# Patient Record
Sex: FEMALE | Race: WHITE | Employment: FULL TIME | URBAN - NONMETROPOLITAN AREA
[De-identification: names, ages, dates, MRNs, and addresses within clinical notes are randomized per-mention and may not be internally consistent; named-entity substitution may affect disease eponyms.]

---

## 2018-04-03 ENCOUNTER — HOSPITAL ENCOUNTER (OUTPATIENT)
Dept: WOMENS IMAGING | Age: 48
Discharge: HOME | End: 2018-04-03
Payer: COMMERCIAL

## 2018-04-03 DIAGNOSIS — Z12.31 VISIT FOR SCREENING MAMMOGRAM: ICD-10-CM

## 2018-04-03 PROCEDURE — 77063 BREAST TOMOSYNTHESIS BI: CPT

## 2018-04-06 ENCOUNTER — HOSPITAL ENCOUNTER (OUTPATIENT)
Dept: WOMENS IMAGING | Age: 48
Discharge: HOME | End: 2018-04-06
Payer: COMMERCIAL

## 2018-04-06 DIAGNOSIS — R92.2 BREAST DENSITY: ICD-10-CM

## 2018-04-06 PROCEDURE — 76642 ULTRASOUND BREAST LIMITED: CPT

## 2018-04-17 ENCOUNTER — HOSPITAL ENCOUNTER (OUTPATIENT)
Dept: WOMENS IMAGING | Age: 48
End: 2018-04-17
Payer: COMMERCIAL

## 2018-04-17 ENCOUNTER — HOSPITAL ENCOUNTER (OUTPATIENT)
Dept: WOMENS IMAGING | Age: 48
Discharge: HOME | End: 2018-04-17
Payer: COMMERCIAL

## 2018-04-17 VITALS — SYSTOLIC BLOOD PRESSURE: 116 MMHG | DIASTOLIC BLOOD PRESSURE: 69 MMHG | HEART RATE: 91 BPM | TEMPERATURE: 98.7 F

## 2018-04-17 DIAGNOSIS — N63.0 BREAST MASS: ICD-10-CM

## 2018-04-17 PROCEDURE — C1894 INTRO/SHEATH, NON-LASER: HCPCS

## 2018-04-17 PROCEDURE — 19083 BX BREAST 1ST LESION US IMAG: CPT

## 2018-04-17 PROCEDURE — 88305 TISSUE EXAM BY PATHOLOGIST: CPT

## 2018-04-17 PROCEDURE — A4648 IMPLANTABLE TISSUE MARKER: HCPCS

## 2019-04-03 ENCOUNTER — OFFICE VISIT (OUTPATIENT)
Dept: PHYSICAL MEDICINE AND REHAB | Age: 49
End: 2019-04-03
Payer: COMMERCIAL

## 2019-04-03 VITALS
BODY MASS INDEX: 29.23 KG/M2 | HEIGHT: 63 IN | DIASTOLIC BLOOD PRESSURE: 83 MMHG | HEART RATE: 96 BPM | WEIGHT: 165 LBS | SYSTOLIC BLOOD PRESSURE: 117 MMHG

## 2019-04-03 DIAGNOSIS — M54.12 CERVICAL RADICULITIS: Primary | ICD-10-CM

## 2019-04-03 DIAGNOSIS — M48.02 CERVICAL SPINAL STENOSIS: ICD-10-CM

## 2019-04-03 DIAGNOSIS — M47.812 SPONDYLOSIS OF CERVICAL REGION WITHOUT MYELOPATHY OR RADICULOPATHY: ICD-10-CM

## 2019-04-03 PROCEDURE — 99244 OFF/OP CNSLTJ NEW/EST MOD 40: CPT | Performed by: PAIN MEDICINE

## 2019-04-03 RX ORDER — CYCLOBENZAPRINE HCL 5 MG
5 TABLET ORAL 3 TIMES DAILY PRN
COMMUNITY

## 2019-04-03 ASSESSMENT — ENCOUNTER SYMPTOMS
DIARRHEA: 0
NAUSEA: 0
ABDOMINAL PAIN: 0
COUGH: 0
BACK PAIN: 0
CONSTIPATION: 0
CHEST TIGHTNESS: 0
PHOTOPHOBIA: 0
SORE THROAT: 0
RHINORRHEA: 0
COLOR CHANGE: 0
SHORTNESS OF BREATH: 0
WHEEZING: 0
VOMITING: 0
SINUS PRESSURE: 0
EYE PAIN: 0

## 2019-04-03 NOTE — PROGRESS NOTES
[address] [facility]  [address] [facility] [address]  Dept: [phone]  Dept Fax: [phone]: [phone]    Visit Date: 4/3/2019    Rickey Prabhakar is a 57 y.o. female who is referred for pain management evaluation and treatment per Dr. Dimitry Artis. CAGE and CAGE-AID Questions   1. In the last three months, have you felt you should cut down or stop drinking or using drugs? Yes []        No [x]     2. In the last three months, has anyone annoyed you or gotten on your nerves by telling you to cut down or stop drinking or using drugs? Yes []        No [x]     3. In the last three months, have you felt guilty or bad about how much you drink or use drugs? Yes []        No [x]     4. In the last three months, have you been waking up wanting to have an alcoholic drink or use drugs? Yes []        No [x]        Opioid Risk Tool:  Clinician Form       1. Family History of Substance Abuse: Female Male    Alcohol   []1   []3    Illegal drugs   []2   []3    Prescription drugs     []4   []4   2. Personal History of Substance Abuse:          Alcohol   []3   []3    Illegal drugs   []4   []4    Prescription drugs     []5   []5   3. Age (margo box if between 12 and 39):     []1   []1   4. History of Preadolescent Sexual Abuse:     []3   []0   5. Psychological Disease:      Attention deficit disorder, obsessive-compulsive disorder, bipolar, schizophrenia   []2   []2      Depression     []1   []1    Scoring Totals 0 0     Total Score  Low Risk  Moderate Risk  High Risk   Risk Category   0 - 3   4 - 7   8 or Above      Patient states symptoms interfere with:   A.  General Activity:  yes   B. Mood: yes    C. Walking Ability:   no   D. Normal Work (Includes both work outside the home and housework):   yes    E.  Relations with Other People:  yes   F. Sleep:   yes   G.  Enjoyment of Life:  yes       HPI: ChiefComplaint: Neck pain    Neck Pain    Chronicity: Patient is a 58 y/o female comes with neck pain for last 2 years. Paient denies any injuries. The problem occurs constantly. The problem has been gradually worsening. The pain is associated with nothing. The pain is present in the left side. The quality of the pain is described as burning and stabbing. Pain scale: States pain scale at worse is a 9/10 and at best 4/10. The symptoms are aggravated by position and bending. The pain is same all the time. Associated symptoms include headaches, numbness and tingling. Pertinent negatives include no chest pain, fever, photophobia or weakness. She has tried muscle relaxants for the symptoms. The treatment provided mild relief. XR CERVICAL SPINE 2016:  IMPRESSION:    1. Mild C5-6. Moderate spurring at this level anteriorly and posterior limb. Slight retrolisthesis C5 on C6, 2 mm. 2. Straightening of the normal cervical lordosis. Cannot exclude muscle spasm. 3. Primary mandibular surgery with metallic screws in the jabier-mandibular rami bilaterally. MRI CERVICAL SPINE 2019:  Shows C6-7 central canal compromise secondary encroachment by posterior disc extrusion  Shows C3-C7 degenerative disc disease and facet arthropathy. The patient has No Known Allergies. PastMedical History  Sloane  has a past medical history of Arthritis and Liver disease. Past Surgical History  The patient  has a past surgical history that includes Hysterectomy; Cholecystectomy; Gonzalez-en-Y Gastric Bypass; Facial reconstruction surgery; fracture surgery; and  section (). Family History  This patient's family history includes Cancer (age of onset: 61) in her maternal grandmother. Social History  Sloane  reports that she has never smoked. She has never used smokeless tobacco. She reports that she drinks alcohol. She reports that she does not use drugs.     Medications    Current Outpatient Medications:    cyclobenzaprine (FLEXERIL) 5 MG tablet, Take 5 mg by mouth 3 times daily as needed for Muscle spasms (only taking nightly), Disp: , Rfl:     Prenatal Multivit-Min-Fe-FA (PRE- FORMULA PO), Take by mouth 2 times daily, Disp: , Rfl:     naproxen (NAPROSYN) 500 MG tablet, Take 1,000 mg by mouth 2 times daily (with meals). , Disp: , Rfl:     ferrous sulfate 325 (65 FE) MG tablet, Take 325 mg by mouth daily (with breakfast). , Disp: , Rfl:     Calcium Carbonate-Vitamin D (CALCIUM + D PO), Take  by mouth.  , Disp: , Rfl:     Cyanocobalamin (VITAMIN B-12 CR) 1000 MCG TBCR, Take  by mouth.  , Disp: , Rfl:     Subjective:      Review of Systems   Constitutional: Positive for activity change. Negative for appetite change, chills, diaphoresis, fatigue, fever and unexpected weight change. HENT: Negative for congestion, ear pain, hearing loss, mouth sores, nosebleeds, rhinorrhea, sinus pressure and sore throat. Eyes: Negative for photophobia, pain and visual disturbance. Respiratory: Negative for cough, chest tightness, shortness of breath and wheezing. Cardiovascular: Negative for chest pain and palpitations. Gastrointestinal: Negative for abdominal pain, constipation, diarrhea, nausea and vomiting. Endocrine: Negative for cold intolerance, heat intolerance, polydipsia, polyphagia and polyuria. Genitourinary: Negative for decreased urine volume, difficulty urinating, frequency and hematuria. Musculoskeletal: Positive for myalgias, neck pain and neck stiffness. Negative for arthralgias, back pain, gait problem and joint swelling. Skin: Negative for color change and rash. Allergic/Immunologic: Negative for food allergies and immunocompromised state. Neurological: Positive for tingling, numbness and headaches. Negative for dizziness, tremors, seizures, syncope, facial asymmetry, speech difficulty, weakness and light-headedness. Hematological: Does not bruise/bleed easily. Psychiatric/Behavioral: Positive for sleep disturbance. Negative for agitation, behavioral problems, confusion, decreased concentration, dysphoric mood, hallucinations, self-injury and suicidal ideas. The patient is not nervous/anxious and is not hyperactive. Objective:     Vitals:    04/03/19 0858   BP: 117/83   Site: Left Upper Arm   Position: Sitting   Cuff Size: Medium Adult   Pulse: 96   Weight: 165 lb (74.8 kg)   Height: 5' 3\" (1.6 m)       Physical Exam   Constitutional: She is oriented to person, place, and time. She appears well-developed and well-nourished. No distress. HENT:   Head: Normocephalic and atraumatic. Right Ear: External ear normal.   Left Ear: External ear normal.   Nose: Nose normal.   Mouth/Throat: Oropharynx is clear and moist. No oropharyngeal exudate. Eyes: Pupils are equal, round, and reactive to light. Conjunctivae and EOM are normal. Right eye exhibits no discharge. Left eye exhibits no discharge. No scleral icterus. Neck: Normal range of motion and full passive range of motion without pain. Neck supple. No muscular tenderness present. No neck rigidity. No edema, no erythema and normal range of motion present. No thyromegaly present. Cardiovascular: Normal rate, regular rhythm, normal heart sounds and intact distal pulses. Exam reveals no gallop and no friction rub. No murmur heard. Pulmonary/Chest: Effort normal and breath sounds normal. No respiratory distress. She has no wheezes. She has no rales. She exhibits no tenderness. Abdominal: Soft. Bowel sounds are normal. She exhibits no distension. There is no tenderness. There is no rebound and no guarding. Musculoskeletal:        Right shoulder: She exhibits normal range of motion and no tenderness. Left shoulder: She exhibits normal range of motion and no tenderness. Right ankle: She exhibits normal range of motion and no swelling.         Left ankle: She exhibits normal range of motion and no swelling. Cervical back: She exhibits decreased range of motion, tenderness and pain. Thoracic back: She exhibits no tenderness. Lumbar back: She exhibits decreased range of motion, tenderness and pain. Back:    Neurological: She is alert and oriented to person, place, and time. She has normal strength and normal reflexes. She is not disoriented. She displays no atrophy. No cranial nerve deficit or sensory deficit. She exhibits normal muscle tone. She displays a negative Romberg sign. Coordination and gait normal. She displays no Babinski's sign on the right side. She displays no Babinski's sign on the left side. Skin: Skin is warm. No rash noted. She is not diaphoretic. No erythema. No pallor. Psychiatric: She has a normal mood and affect. Her speech is normal and behavior is normal. Judgment and thought content normal. Her mood appears not anxious. Her affect is not angry, not blunt, not labile and not inappropriate. She is not agitated, not aggressive, not hyperactive, not slowed, not withdrawn, not actively hallucinating and not combative. Thought content is not paranoid and not delusional. Cognition and memory are normal. Cognition and memory are not impaired. She does not express impulsivity or inappropriate judgment. She does not exhibit a depressed mood. She expresses no homicidal and no suicidal ideation. She expresses no suicidal plans and no homicidal plans. She exhibits normal recent memory and normal remote memory. She is attentive. Nursing note and vitals reviewed. ROBIN test: neg  Yeoman's test: neg  Gaenslen test: neg     Assessment:     1. Cervical radiculitis    2. Cervical spinal stenosis    3. Spondylosis of cervical region without myelopathy or radiculopathy            Plan:      · Patient read and signed orientation and opioid agreement. · OARRS reviewed. Current MED: 0  · Patient was not offered naloxone for home.    · Discussed long term side effects of medications, tolerance, dependency and addiction. · Patient told can not receive any pain medications from any other source. · No evidence of abuse, diversion or aberrant behavior. · Prescription Needs: No prescription pain medications at this time   Medications and/or procedures to improve function and quality of life- patient understanding with this and that may not be pain free   Discussed possible weaning of medication dosing dependent on treatment/procedure results.  Discussed with patient about safe storage of medications at home   Testing: Reviewed MRI Cervical spine and XR with patient.  Procedures: Discussed BO at length.  Discussed with patient about risks with procedure including infection, reaction to medication, increased pain, or bleeding.  Medications:Reviewed. Previous Treatments tried:  · PT: No,  Secondary cost  · NSAIDs: Yes,  any benefit? No,  how long taken: takes  · Chiropractic: Yes,  any benefit? No  · Muscle relaxants: Yes,  any benefit? No  · Narcotics: No,  any benefit? No  · Spine surgeon consult: No  · Any Implants: Yes RLE and facial    Meds. Prescribed:   No orders of the defined types were placed in this encounter. Return for BO @ C6 or C7  left. Time spent with patient was 60 minutes more than 50% was spent  Counseling/coordinated the patient'scare.     Electronically signed by Alexei Ngo MD on 4/3/2019 at 5:40 PM

## 2019-04-03 NOTE — LETTER
[address] [facility]  [address] [facility] [address]  Phone: [phone]  Fax: [phone]    Vida Galvan MD        April 14, 2019       Patient: Faith Diamond   MR Number: [id number]   YOB: 1970   Date of Visit: 4/3/2019       Dear Dr. Marzena Tiwari:    Thank you for the request for consultation for Sherry Glass to me for the evaluation of neck pain. Below are the relevant portions of my assessment and plan of care. If you have questions, please do not hesitate to call me. I look forward to following Sloane along with you.     Sincerely,        Sandy Auguste MD    CC providers:  Paige Quigley MD  [address] [place] [address]  VIA In [address] [place] [address], MD [facility]y [facility]  [address] [facility]  [facility] [place]

## 2019-04-04 ENCOUNTER — TELEPHONE (OUTPATIENT)
Dept: PHYSICAL MEDICINE AND REHAB | Age: 49
End: 2019-04-04

## 2019-04-16 ENCOUNTER — HOSPITAL ENCOUNTER (OUTPATIENT)
Dept: WOMENS IMAGING | Age: 49
Discharge: HOME | End: 2019-04-16
Payer: COMMERCIAL

## 2019-04-16 DIAGNOSIS — Z12.31 VISIT FOR SCREENING MAMMOGRAM: ICD-10-CM

## 2019-04-16 PROCEDURE — 77063 BREAST TOMOSYNTHESIS BI: CPT

## 2019-04-23 ENCOUNTER — PREP FOR PROCEDURE (OUTPATIENT)
Dept: PHYSICAL MEDICINE AND REHAB | Age: 49
End: 2019-04-23

## 2019-04-23 NOTE — H&P (VIEW-ONLY)
Alex Obrien is an 57 y.o.  female. ChiefComplaint: Neck pain       The patient has No Known Allergies. PastMedical History  Sloane  has a past medical history of Arthritis and Liver disease. Past Surgical History  The patient  has a past surgical history that includes Hysterectomy; Cholecystectomy; Gonzalez-en-Y Gastric Bypass; Facial reconstruction surgery; fracture surgery; and  section (). Family History  This patient's family history includes Cancer (age of onset: 61) in her maternal grandmother. Social History  Sloane  reports that she has never smoked. She has never used smokeless tobacco. She reports that she drinks alcohol. She reports that she does not use drugs. Medications    Current Medication      Current Outpatient Medications:     cyclobenzaprine (FLEXERIL) 5 MG tablet, Take 5 mg by mouth 3 times daily as needed for Muscle spasms (only taking nightly), Disp: , Rfl:     Prenatal Multivit-Min-Fe-FA (PRE- FORMULA PO), Take by mouth 2 times daily, Disp: , Rfl:     naproxen (NAPROSYN) 500 MG tablet, Take 1,000 mg by mouth 2 times daily (with meals). , Disp: , Rfl:     ferrous sulfate 325 (65 FE) MG tablet, Take 325 mg by mouth daily (with breakfast). , Disp: , Rfl:     Calcium Carbonate-Vitamin D (CALCIUM + D PO), Take  by mouth.  , Disp: , Rfl:     Cyanocobalamin (VITAMIN B-12 CR) 1000 MCG TBCR, Take  by mouth.  , Disp: , Rfl:         Subjective:      Review of Systems   Constitutional: Positive for activity change. Negative for appetite change, chills, diaphoresis, fatigue, fever and unexpected weight change. HENT: Negative for congestion, ear pain, hearing loss, mouth sores, nosebleeds, rhinorrhea, sinus pressure and sore throat. Eyes: Negative for photophobia, pain and visual disturbance. Respiratory: Negative for cough, chest tightness, shortness of breath and wheezing. Cardiovascular: Negative for chest pain and palpitations.    Gastrointestinal: Negative for abdominal pain, constipation, diarrhea, nausea and vomiting. Endocrine: Negative for cold intolerance, heat intolerance, polydipsia, polyphagia and polyuria. Genitourinary: Negative for decreased urine volume, difficulty urinating, frequency and hematuria. Musculoskeletal: Positive for myalgias, neck pain and neck stiffness. Negative for arthralgias, back pain, gait problem and joint swelling. Skin: Negative for color change and rash. Allergic/Immunologic: Negative for food allergies and immunocompromised state. Neurological: Positive for tingling, numbness and headaches. Negative for dizziness, tremors, seizures, syncope, facial asymmetry, speech difficulty, weakness and light-headedness. Hematological: Does not bruise/bleed easily. Psychiatric/Behavioral: Positive for sleep disturbance. Negative for agitation, behavioral problems, confusion, decreased concentration, dysphoric mood, hallucinations, self-injury and suicidal ideas. The patient is not nervous/anxious and is not hyperactive. Objective:      Vitals                                  Weight: 165 lb (74.8 kg)   Height: 5' 3\" (1.6 m)            Physical Exam   Constitutional: She is oriented to person, place, and time. She appears well-developed and well-nourished. No distress. HENT:   Head: Normocephalic and atraumatic. Right Ear: External ear normal.   Left Ear: External ear normal.   Nose: Nose normal.   Mouth/Throat: Oropharynx is clear and moist. No oropharyngeal exudate. Eyes: Pupils are equal, round, and reactive to light. Conjunctivae and EOM are normal. Right eye exhibits no discharge. Left eye exhibits no discharge. No scleral icterus. Neck: Normal range of motion and full passive range of motion without pain. Neck supple. No muscular tenderness present. No neck rigidity. No edema, no erythema and normal range of motion present. No thyromegaly present.    Cardiovascular: Normal rate, regular rhythm, normal heart sounds and intact distal pulses. Exam reveals no gallop and no friction rub. No murmur heard. Pulmonary/Chest: Effort normal and breath sounds normal. No respiratory distress. She has no wheezes. She has no rales. She exhibits no tenderness. Abdominal: Soft. Bowel sounds are normal. She exhibits no distension. There is no tenderness. There is no rebound and no guarding. Musculoskeletal:        Right shoulder: She exhibits normal range of motion and no tenderness. Left shoulder: She exhibits normal range of motion and no tenderness. Right ankle: She exhibits normal range of motion and no swelling. Left ankle: She exhibits normal range of motion and no swelling. Cervical back: She exhibits decreased range of motion, tenderness and pain. Thoracic back: She exhibits no tenderness. Lumbar back: She exhibits decreased range of motion, tenderness and pain. Back:    Neurological: She is alert and oriented to person, place, and time. She has normal strength and normal reflexes. She is not disoriented. She displays no atrophy. No cranial nerve deficit or sensory deficit. She exhibits normal muscle tone. She displays a negative Romberg sign. Coordination and gait normal. She displays no Babinski's sign on the right side. She displays no Babinski's sign on the left side. Skin: Skin is warm. No rash noted. She is not diaphoretic. No erythema. No pallor. Psychiatric: She has a normal mood and affect. Her speech is normal and behavior is normal. Judgment and thought content normal. Her mood appears not anxious. Her affect is not angry, not blunt, not labile and not inappropriate. She is not agitated, not aggressive, not hyperactive, not slowed, not withdrawn, not actively hallucinating and not combative. Thought content is not paranoid and not delusional. Cognition and memory are normal. Cognition and memory are not impaired.  She does not express impulsivity or inappropriate judgment. She does not exhibit a depressed mood. She expresses no homicidal and no suicidal ideation. She expresses no suicidal plans and no homicidal plans. She exhibits normal recent memory and normal remote memory. She is attentive. Nursing note and vitals reviewed. ROBIN test: neg  Yeoman's test: neg  Gaenslen test: neg  Assessment:      1. Cervical radiculitis    2. Cervical spinal stenosis    3.  Spondylosis of cervical region without myelopathy or radiculopathy            Plan:  EPIDURAL STEROID INJECTION BO @ C6 or C7       PAOLO Willett - CNP  4/23/2019

## 2019-04-23 NOTE — PROGRESS NOTES
NPO after midnight  [employer] and drivers license  Wear comfortable clean clothing  Do not bring jewelry  Shower night before and morning of surgery with a liquid antibacterial soap  Bring list of medications with dosage and how often taken  Follow all instructions given by your physician   needed at discharge  Call [phone] for any questions

## 2019-04-23 NOTE — H&P
Haseeb Muñoz is an 57 y.o.  female. ChiefComplaint: Neck pain       The patient has No Known Allergies. PastMedical History  Sloane  has a past medical history of Arthritis and Liver disease. Past Surgical History  The patient  has a past surgical history that includes Hysterectomy; Cholecystectomy; Gonzalez-en-Y Gastric Bypass; Facial reconstruction surgery; fracture surgery; and  section (). Family History  This patient's family history includes Cancer (age of onset: 61) in her maternal grandmother. Social History  Sloane  reports that she has never smoked. She has never used smokeless tobacco. She reports that she drinks alcohol. She reports that she does not use drugs. Medications    Current Medication      Current Outpatient Medications:     cyclobenzaprine (FLEXERIL) 5 MG tablet, Take 5 mg by mouth 3 times daily as needed for Muscle spasms (only taking nightly), Disp: , Rfl:     Prenatal Multivit-Min-Fe-FA (PRE- FORMULA PO), Take by mouth 2 times daily, Disp: , Rfl:     naproxen (NAPROSYN) 500 MG tablet, Take 1,000 mg by mouth 2 times daily (with meals). , Disp: , Rfl:     ferrous sulfate 325 (65 FE) MG tablet, Take 325 mg by mouth daily (with breakfast). , Disp: , Rfl:     Calcium Carbonate-Vitamin D (CALCIUM + D PO), Take  by mouth.  , Disp: , Rfl:     Cyanocobalamin (VITAMIN B-12 CR) 1000 MCG TBCR, Take  by mouth.  , Disp: , Rfl:         Subjective:      Review of Systems   Constitutional: Positive for activity change. Negative for appetite change, chills, diaphoresis, fatigue, fever and unexpected weight change. HENT: Negative for congestion, ear pain, hearing loss, mouth sores, nosebleeds, rhinorrhea, sinus pressure and sore throat. Eyes: Negative for photophobia, pain and visual disturbance. Respiratory: Negative for cough, chest tightness, shortness of breath and wheezing. Cardiovascular: Negative for chest pain and palpitations.    Gastrointestinal: Negative for abdominal pain, constipation, diarrhea, nausea and vomiting. Endocrine: Negative for cold intolerance, heat intolerance, polydipsia, polyphagia and polyuria. Genitourinary: Negative for decreased urine volume, difficulty urinating, frequency and hematuria. Musculoskeletal: Positive for myalgias, neck pain and neck stiffness. Negative for arthralgias, back pain, gait problem and joint swelling. Skin: Negative for color change and rash. Allergic/Immunologic: Negative for food allergies and immunocompromised state. Neurological: Positive for tingling, numbness and headaches. Negative for dizziness, tremors, seizures, syncope, facial asymmetry, speech difficulty, weakness and light-headedness. Hematological: Does not bruise/bleed easily. Psychiatric/Behavioral: Positive for sleep disturbance. Negative for agitation, behavioral problems, confusion, decreased concentration, dysphoric mood, hallucinations, self-injury and suicidal ideas. The patient is not nervous/anxious and is not hyperactive. Objective:      Vitals                                  Weight: 165 lb (74.8 kg)   Height: 5' 3\" (1.6 m)            Physical Exam   Constitutional: She is oriented to person, place, and time. She appears well-developed and well-nourished. No distress. HENT:   Head: Normocephalic and atraumatic. Right Ear: External ear normal.   Left Ear: External ear normal.   Nose: Nose normal.   Mouth/Throat: Oropharynx is clear and moist. No oropharyngeal exudate. Eyes: Pupils are equal, round, and reactive to light. Conjunctivae and EOM are normal. Right eye exhibits no discharge. Left eye exhibits no discharge. No scleral icterus. Neck: Normal range of motion and full passive range of motion without pain. Neck supple. No muscular tenderness present. No neck rigidity. No edema, no erythema and normal range of motion present. No thyromegaly present.    Cardiovascular: Normal rate, regular rhythm, normal heart sounds and intact distal pulses. Exam reveals no gallop and no friction rub. No murmur heard. Pulmonary/Chest: Effort normal and breath sounds normal. No respiratory distress. She has no wheezes. She has no rales. She exhibits no tenderness. Abdominal: Soft. Bowel sounds are normal. She exhibits no distension. There is no tenderness. There is no rebound and no guarding. Musculoskeletal:        Right shoulder: She exhibits normal range of motion and no tenderness. Left shoulder: She exhibits normal range of motion and no tenderness. Right ankle: She exhibits normal range of motion and no swelling. Left ankle: She exhibits normal range of motion and no swelling. Cervical back: She exhibits decreased range of motion, tenderness and pain. Thoracic back: She exhibits no tenderness. Lumbar back: She exhibits decreased range of motion, tenderness and pain. Back:    Neurological: She is alert and oriented to person, place, and time. She has normal strength and normal reflexes. She is not disoriented. She displays no atrophy. No cranial nerve deficit or sensory deficit. She exhibits normal muscle tone. She displays a negative Romberg sign. Coordination and gait normal. She displays no Babinski's sign on the right side. She displays no Babinski's sign on the left side. Skin: Skin is warm. No rash noted. She is not diaphoretic. No erythema. No pallor. Psychiatric: She has a normal mood and affect. Her speech is normal and behavior is normal. Judgment and thought content normal. Her mood appears not anxious. Her affect is not angry, not blunt, not labile and not inappropriate. She is not agitated, not aggressive, not hyperactive, not slowed, not withdrawn, not actively hallucinating and not combative. Thought content is not paranoid and not delusional. Cognition and memory are normal. Cognition and memory are not impaired.  She does not express impulsivity or inappropriate judgment. She does not exhibit a depressed mood. She expresses no homicidal and no suicidal ideation. She expresses no suicidal plans and no homicidal plans. She exhibits normal recent memory and normal remote memory. She is attentive. Nursing note and vitals reviewed. ROBIN test: neg  Yeoman's test: neg  Gaenslen test: neg  Assessment:      1. Cervical radiculitis    2. Cervical spinal stenosis    3.  Spondylosis of cervical region without myelopathy or radiculopathy            Plan:  EPIDURAL STEROID INJECTION BO @ C6 or C7       PAOLO Beltrán - CNP  4/23/2019

## 2019-04-30 ENCOUNTER — APPOINTMENT (OUTPATIENT)
Dept: GENERAL RADIOLOGY | Age: 49
End: 2019-04-30
Attending: PAIN MEDICINE
Payer: COMMERCIAL

## 2019-04-30 ENCOUNTER — ANESTHESIA (OUTPATIENT)
Dept: OPERATING ROOM | Age: 49
End: 2019-04-30
Payer: COMMERCIAL

## 2019-04-30 ENCOUNTER — HOSPITAL ENCOUNTER (OUTPATIENT)
Age: 49
Setting detail: OUTPATIENT SURGERY
Discharge: HOME | End: 2019-04-30
Attending: PAIN MEDICINE | Admitting: PAIN MEDICINE
Payer: COMMERCIAL

## 2019-04-30 ENCOUNTER — ANESTHESIA EVENT (OUTPATIENT)
Dept: OPERATING ROOM | Age: 49
End: 2019-04-30
Payer: COMMERCIAL

## 2019-04-30 VITALS
HEIGHT: 63 IN | BODY MASS INDEX: 28.77 KG/M2 | DIASTOLIC BLOOD PRESSURE: 80 MMHG | TEMPERATURE: 97.7 F | OXYGEN SATURATION: 100 % | WEIGHT: 162.4 LBS | HEART RATE: 89 BPM | SYSTOLIC BLOOD PRESSURE: 128 MMHG | RESPIRATION RATE: 16 BRPM

## 2019-04-30 VITALS
RESPIRATION RATE: 18 BRPM | DIASTOLIC BLOOD PRESSURE: 70 MMHG | OXYGEN SATURATION: 100 % | SYSTOLIC BLOOD PRESSURE: 118 MMHG

## 2019-04-30 PROCEDURE — 2580000003 HC RX 258: Performed by: PAIN MEDICINE

## 2019-04-30 PROCEDURE — 3700000000 HC ANESTHESIA ATTENDED CARE: Performed by: PAIN MEDICINE

## 2019-04-30 PROCEDURE — 2500000003 HC RX 250 WO HCPCS: Performed by: PAIN MEDICINE

## 2019-04-30 PROCEDURE — 62321 NJX INTERLAMINAR CRV/THRC: CPT | Performed by: PAIN MEDICINE

## 2019-04-30 PROCEDURE — 3209999900 FLUORO FOR SURGICAL PROCEDURES

## 2019-04-30 PROCEDURE — 6360000004 HC RX CONTRAST MEDICATION: Performed by: PAIN MEDICINE

## 2019-04-30 PROCEDURE — 2709999900 HC NON-CHARGEABLE SUPPLY: Performed by: PAIN MEDICINE

## 2019-04-30 PROCEDURE — 7100000011 HC PHASE II RECOVERY - ADDTL 15 MIN: Performed by: PAIN MEDICINE

## 2019-04-30 PROCEDURE — 6360000002 HC RX W HCPCS: Performed by: PAIN MEDICINE

## 2019-04-30 PROCEDURE — 7100000010 HC PHASE II RECOVERY - FIRST 15 MIN: Performed by: PAIN MEDICINE

## 2019-04-30 PROCEDURE — 6360000002 HC RX W HCPCS: Performed by: NURSE ANESTHETIST, CERTIFIED REGISTERED

## 2019-04-30 PROCEDURE — 3600000052 HC PAIN LEVEL 2 BASE: Performed by: PAIN MEDICINE

## 2019-04-30 RX ORDER — 0.9 % SODIUM CHLORIDE 0.9 %
VIAL (ML) INJECTION PRN
Status: DISCONTINUED | OUTPATIENT
Start: 2019-04-30 | End: 2019-04-30 | Stop reason: ALTCHOICE

## 2019-04-30 RX ORDER — LIDOCAINE HYDROCHLORIDE 10 MG/ML
INJECTION, SOLUTION INFILTRATION; PERINEURAL PRN
Status: DISCONTINUED | OUTPATIENT
Start: 2019-04-30 | End: 2019-04-30 | Stop reason: ALTCHOICE

## 2019-04-30 RX ORDER — FENTANYL CITRATE 50 UG/ML
INJECTION, SOLUTION INTRAMUSCULAR; INTRAVENOUS PRN
Status: DISCONTINUED | OUTPATIENT
Start: 2019-04-30 | End: 2019-04-30 | Stop reason: SDUPTHER

## 2019-04-30 RX ORDER — ONDANSETRON 2 MG/ML
INJECTION INTRAMUSCULAR; INTRAVENOUS PRN
Status: DISCONTINUED | OUTPATIENT
Start: 2019-04-30 | End: 2019-04-30 | Stop reason: SDUPTHER

## 2019-04-30 RX ORDER — DEXAMETHASONE SODIUM PHOSPHATE 4 MG/ML
INJECTION, SOLUTION INTRA-ARTICULAR; INTRALESIONAL; INTRAMUSCULAR; INTRAVENOUS; SOFT TISSUE PRN
Status: DISCONTINUED | OUTPATIENT
Start: 2019-04-30 | End: 2019-04-30 | Stop reason: ALTCHOICE

## 2019-04-30 RX ADMIN — FENTANYL CITRATE 100 MCG: 50 INJECTION INTRAMUSCULAR; INTRAVENOUS at 14:53

## 2019-04-30 RX ADMIN — ONDANSETRON HYDROCHLORIDE 4 MG: 4 INJECTION, SOLUTION INTRAMUSCULAR; INTRAVENOUS at 14:59

## 2019-04-30 ASSESSMENT — PAIN - FUNCTIONAL ASSESSMENT: PAIN_FUNCTIONAL_ASSESSMENT: 0-10

## 2019-04-30 ASSESSMENT — PAIN DESCRIPTION - DESCRIPTORS: DESCRIPTORS: BURNING;CONSTANT

## 2019-04-30 NOTE — ANESTHESIA POSTPROCEDURE EVALUATION
Department of Anesthesiology  Postprocedure Note    Patient: Cirilo Andrew  MRN: [id number]  YOB: 1970  Date of evaluation: 4/30/2019  Time:  3:02 PM     Procedure Summary     Date:  04/30/19 Room / Location:  [facility] OR 03 / [address] [facility]    Anesthesia Start:  1450 Anesthesia Stop:  7383    Procedure:  EPIDURAL STEROID INJECTION BO @ C6 LEFT (N/A Neck) Diagnosis:  (Cervical spondylosis)    Surgeon:  River Chilel MD Responsible Provider:  Derek Ahuja MD    Anesthesia Type:  MAC ASA Status:  2          Anesthesia Type: MAC    Precious Phase I:      Precious Phase II:      Last vitals: Reviewed and per EMR flowsheets.        Anesthesia Post Evaluation    Patient location during evaluation: PACU  Patient participation: complete - patient participated  Level of consciousness: awake and alert  Airway patency: patent  Nausea & Vomiting: no nausea  Complications: no  Cardiovascular status: blood pressure returned to baseline and hemodynamically stable  Respiratory status: acceptable and spontaneous ventilation  Hydration status: euvolemic

## 2019-04-30 NOTE — PROGRESS NOTES
1502- Pt to PACU phase 2 was nauseated in OR, just given zofran, VSS, pt breathing deeply on room air, ice chips given, fiancee at bedside. 1520- IV removed. 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 1527- Pt discharged home assisted and ambulated out to the car gait steady.

## 2019-04-30 NOTE — INTERVAL H&P NOTE
H&P Update    Patient's History and Physical from April 23, 2019 was reviewed. Patient examined. There has been no change.     Van Ramsey

## 2019-04-30 NOTE — ANESTHESIA PRE PROCEDURE
Department of Anesthesiology  Preprocedure Note       Name:  Elena Good   Age:  57 y.o.  :  1970                                          MRN:  [id number]         Date:  2019      Surgeon: Venkata Mercado):  Aubrey Chaney MD    Procedure: EPIDURAL STEROID INJECTION BO @ C6 or C7 LEFT (N/A Neck)    Medications prior to admission:   Prior to Admission medications    Medication Sig Start Date End Date Taking? Authorizing Provider   cyclobenzaprine (FLEXERIL) 5 MG tablet Take 5 mg by mouth 3 times daily as needed for Muscle spasms (only taking nightly)   Yes Historical Provider, MD   Prenatal Multivit-Min-Fe-FA (PRE-SUNDAY FORMULA PO) Take by mouth 2 times daily   Yes Historical Provider, MD   naproxen (NAPROSYN) 500 MG tablet Take 1,000 mg by mouth 2 times daily as needed    Yes Historical Provider, MD   ferrous sulfate 325 (65 FE) MG tablet Take 325 mg by mouth daily (with breakfast). Yes Historical Provider, MD   Calcium Carbonate-Vitamin D (CALCIUM + D PO) Take by mouth nightly    Yes Historical Provider, MD   Cyanocobalamin (VITAMIN B-12 CR) 1000 MCG TBCR Take by mouth daily    Yes Historical Provider, MD       Current medications:    No current facility-administered medications for this encounter. Allergies:  No Known Allergies    Problem List:    Patient Active Problem List   Diagnosis Code    Acute appendicitis K35.80       Past Medical History:        Diagnosis Date    Arthritis     Liver disease     PONV (postoperative nausea and vomiting)        Past Surgical History:        Procedure Laterality Date    APPENDECTOMY       SECTION      CHOLECYSTECTOMY      FACIAL RECONSTRUCTION SURGERY      FRACTURE SURGERY      right lower leg cadaver bone    HYSTERECTOMY      SHELLY-EN-Y GASTRIC BYPASS         Social History:    Social History     Tobacco Use    Smoking status: Never Smoker    Smokeless tobacco: Never Used   Substance Use Topics    Alcohol use:  Yes Negative Neuro/Psych ROS              GI/Hepatic/Renal:             Endo/Other:    (+) : arthritis:., .          Pt had no PAT visit       Abdominal:           Vascular: negative vascular ROS. Anesthesia Plan      MAC     ASA 2       Induction: intravenous. Anesthetic plan and risks discussed with patient. Plan discussed with CRNA.                   Divya Mcgrath MD   4/30/2019

## 2019-04-30 NOTE — OP NOTE
lateral views of the fluoroscopy after injecting about 2 ml of Omnipaque-180 and observing the spread of the contrast in the epidural space. Then after negative aspiration a total of 12 mg of Celestone with 3 ml of normal saline were injected into the epidural space. The needle is removed and a Band-Aid was placed over the needle insertion site. Patient's vital signs remained stable and tolerated the procedure well. Patient was discharged home in stable condition and will be followed in the pain clinic in next few weeks for further planning.     Electronically signed by Jayson Harp MD on 4/10/2019 at 3:02 PM

## 2020-01-23 ENCOUNTER — HOSPITAL ENCOUNTER (INPATIENT)
Age: 50
LOS: 4 days | Discharge: HOME | DRG: 378 | End: 2020-01-27
Attending: FAMILY MEDICINE | Admitting: FAMILY MEDICINE
Payer: COMMERCIAL

## 2020-01-23 PROBLEM — K92.2 GIB (GASTROINTESTINAL BLEEDING): Status: ACTIVE | Noted: 2020-01-23

## 2020-01-23 LAB
ABO: NORMAL
ALBUMIN SERPL-MCNC: 4.3 G/DL (ref 3.5–5.1)
ALP BLD-CCNC: 75 U/L (ref 38–126)
ALT SERPL-CCNC: 10 U/L (ref 11–66)
ANION GAP SERPL CALCULATED.3IONS-SCNC: 12 MEQ/L (ref 8–16)
ANTIBODY SCREEN: NORMAL
APTT: 31.3 SECONDS (ref 22–38)
AST SERPL-CCNC: 13 U/L (ref 5–40)
BASOPHILS # BLD: 0.7 %
BASOPHILS ABSOLUTE: 0.1 THOU/MM3 (ref 0–0.1)
BILIRUB SERPL-MCNC: 0.3 MG/DL (ref 0.3–1.2)
BILIRUBIN DIRECT: < 0.2 MG/DL (ref 0–0.3)
BUN BLDV-MCNC: 21 MG/DL (ref 7–22)
CALCIUM SERPL-MCNC: 8.8 MG/DL (ref 8.5–10.5)
CHLORIDE BLD-SCNC: 103 MEQ/L (ref 98–111)
CO2: 26 MEQ/L (ref 23–33)
CREAT SERPL-MCNC: 0.7 MG/DL (ref 0.4–1.2)
EOSINOPHIL # BLD: 3.5 %
EOSINOPHILS ABSOLUTE: 0.3 THOU/MM3 (ref 0–0.4)
ERYTHROCYTE [DISTWIDTH] IN BLOOD BY AUTOMATED COUNT: 13 % (ref 11.5–14.5)
ERYTHROCYTE [DISTWIDTH] IN BLOOD BY AUTOMATED COUNT: 41.1 FL (ref 35–45)
GFR SERPL CREATININE-BSD FRML MDRD: 90 ML/MIN/1.73M2
GLUCOSE BLD-MCNC: 106 MG/DL (ref 70–108)
HCT VFR BLD CALC: 27.5 % (ref 37–47)
HCT VFR BLD CALC: 30.9 % (ref 37–47)
HEMOCCULT STL QL: POSITIVE
HEMOGLOBIN: 10.2 GM/DL (ref 12–16)
HEMOGLOBIN: 8.9 GM/DL (ref 12–16)
IMMATURE GRANS (ABS): 0.02 THOU/MM3 (ref 0–0.07)
IMMATURE GRANULOCYTES: 0.2 %
INR BLD: 1.09 (ref 0.85–1.13)
LYMPHOCYTES # BLD: 34.8 %
LYMPHOCYTES ABSOLUTE: 3.1 THOU/MM3 (ref 1–4.8)
MCH RBC QN AUTO: 28.8 PG (ref 26–33)
MCHC RBC AUTO-ENTMCNC: 33 GM/DL (ref 32.2–35.5)
MCV RBC AUTO: 87.3 FL (ref 81–99)
MONOCYTES # BLD: 4.2 %
MONOCYTES ABSOLUTE: 0.4 THOU/MM3 (ref 0.4–1.3)
NUCLEATED RED BLOOD CELLS: 0 /100 WBC
OSMOLALITY CALCULATION: 284.6 MOSMOL/KG (ref 275–300)
PLATELET # BLD: 222 THOU/MM3 (ref 130–400)
PMV BLD AUTO: 10.8 FL (ref 9.4–12.4)
POTASSIUM SERPL-SCNC: 3.9 MEQ/L (ref 3.5–5.2)
RBC # BLD: 3.54 MILL/MM3 (ref 4.2–5.4)
RH FACTOR: NORMAL
SEG NEUTROPHILS: 56.6 %
SEGMENTED NEUTROPHILS ABSOLUTE COUNT: 5 THOU/MM3 (ref 1.8–7.7)
SODIUM BLD-SCNC: 141 MEQ/L (ref 135–145)
TOTAL PROTEIN: 6.5 G/DL (ref 6.1–8)
WBC # BLD: 8.9 THOU/MM3 (ref 4.8–10.8)

## 2020-01-23 PROCEDURE — 86901 BLOOD TYPING SEROLOGIC RH(D): CPT

## 2020-01-23 PROCEDURE — 36415 COLL VENOUS BLD VENIPUNCTURE: CPT

## 2020-01-23 PROCEDURE — 86923 COMPATIBILITY TEST ELECTRIC: CPT

## 2020-01-23 PROCEDURE — 6360000002 HC RX W HCPCS: Performed by: NURSE PRACTITIONER

## 2020-01-23 PROCEDURE — 99284 EMERGENCY DEPT VISIT MOD MDM: CPT

## 2020-01-23 PROCEDURE — 85730 THROMBOPLASTIN TIME PARTIAL: CPT

## 2020-01-23 PROCEDURE — 82272 OCCULT BLD FECES 1-3 TESTS: CPT

## 2020-01-23 PROCEDURE — 85025 COMPLETE CBC W/AUTO DIFF WBC: CPT

## 2020-01-23 PROCEDURE — 96374 THER/PROPH/DIAG INJ IV PUSH: CPT

## 2020-01-23 PROCEDURE — 2580000003 HC RX 258: Performed by: FAMILY MEDICINE

## 2020-01-23 PROCEDURE — 1200000003 HC TELEMETRY R&B

## 2020-01-23 PROCEDURE — P9016 RBC LEUKOCYTES REDUCED: HCPCS

## 2020-01-23 PROCEDURE — 85018 HEMOGLOBIN: CPT

## 2020-01-23 PROCEDURE — 85610 PROTHROMBIN TIME: CPT

## 2020-01-23 PROCEDURE — 99223 1ST HOSP IP/OBS HIGH 75: CPT | Performed by: FAMILY MEDICINE

## 2020-01-23 PROCEDURE — 85014 HEMATOCRIT: CPT

## 2020-01-23 PROCEDURE — 2580000003 HC RX 258: Performed by: NURSE PRACTITIONER

## 2020-01-23 PROCEDURE — 86900 BLOOD TYPING SEROLOGIC ABO: CPT

## 2020-01-23 PROCEDURE — 0DB78ZX EXCISION OF STOMACH, PYLORUS, VIA NATURAL OR ARTIFICIAL OPENING ENDOSCOPIC, DIAGNOSTIC: ICD-10-PCS | Performed by: INTERNAL MEDICINE

## 2020-01-23 PROCEDURE — C9113 INJ PANTOPRAZOLE SODIUM, VIA: HCPCS | Performed by: NURSE PRACTITIONER

## 2020-01-23 PROCEDURE — 80053 COMPREHEN METABOLIC PANEL: CPT

## 2020-01-23 PROCEDURE — 86850 RBC ANTIBODY SCREEN: CPT

## 2020-01-23 PROCEDURE — 82248 BILIRUBIN DIRECT: CPT

## 2020-01-23 RX ORDER — ACETAMINOPHEN 325 MG/1
650 TABLET ORAL EVERY 4 HOURS PRN
Status: DISCONTINUED | OUTPATIENT
Start: 2020-01-23 | End: 2020-01-27 | Stop reason: HOSPADM

## 2020-01-23 RX ORDER — PANTOPRAZOLE SODIUM 40 MG/10ML
40 INJECTION, POWDER, LYOPHILIZED, FOR SOLUTION INTRAVENOUS ONCE
Status: DISCONTINUED | OUTPATIENT
Start: 2020-01-23 | End: 2020-01-24

## 2020-01-23 RX ORDER — 0.9 % SODIUM CHLORIDE 0.9 %
1000 INTRAVENOUS SOLUTION INTRAVENOUS ONCE
Status: COMPLETED | OUTPATIENT
Start: 2020-01-23 | End: 2020-01-23

## 2020-01-23 RX ORDER — SODIUM CHLORIDE 0.9 % (FLUSH) 0.9 %
10 SYRINGE (ML) INJECTION PRN
Status: DISCONTINUED | OUTPATIENT
Start: 2020-01-23 | End: 2020-01-27 | Stop reason: HOSPADM

## 2020-01-23 RX ORDER — ONDANSETRON 2 MG/ML
4 INJECTION INTRAMUSCULAR; INTRAVENOUS EVERY 6 HOURS PRN
Status: DISCONTINUED | OUTPATIENT
Start: 2020-01-23 | End: 2020-01-27 | Stop reason: HOSPADM

## 2020-01-23 RX ORDER — PANTOPRAZOLE SODIUM 40 MG/10ML
40 INJECTION, POWDER, LYOPHILIZED, FOR SOLUTION INTRAVENOUS ONCE
Status: COMPLETED | OUTPATIENT
Start: 2020-01-23 | End: 2020-01-23

## 2020-01-23 RX ORDER — SODIUM CHLORIDE 9 MG/ML
INJECTION, SOLUTION INTRAVENOUS CONTINUOUS
Status: DISCONTINUED | OUTPATIENT
Start: 2020-01-23 | End: 2020-01-25

## 2020-01-23 RX ORDER — PANTOPRAZOLE SODIUM 40 MG/10ML
40 INJECTION, POWDER, LYOPHILIZED, FOR SOLUTION INTRAVENOUS 2 TIMES DAILY
Status: DISCONTINUED | OUTPATIENT
Start: 2020-01-23 | End: 2020-01-24

## 2020-01-23 RX ORDER — SODIUM CHLORIDE 0.9 % (FLUSH) 0.9 %
10 SYRINGE (ML) INJECTION EVERY 12 HOURS SCHEDULED
Status: DISCONTINUED | OUTPATIENT
Start: 2020-01-23 | End: 2020-01-27 | Stop reason: HOSPADM

## 2020-01-23 RX ORDER — LANOLIN ALCOHOL/MO/W.PET/CERES
1000 CREAM (GRAM) TOPICAL DAILY
Status: DISCONTINUED | OUTPATIENT
Start: 2020-01-24 | End: 2020-01-27 | Stop reason: HOSPADM

## 2020-01-23 RX ADMIN — PANTOPRAZOLE SODIUM 40 MG: 40 INJECTION, POWDER, FOR SOLUTION INTRAVENOUS at 21:30

## 2020-01-23 RX ADMIN — SODIUM CHLORIDE: 9 INJECTION, SOLUTION INTRAVENOUS at 22:21

## 2020-01-23 RX ADMIN — SODIUM CHLORIDE 1000 ML: 9 INJECTION, SOLUTION INTRAVENOUS at 18:35

## 2020-01-23 ASSESSMENT — ENCOUNTER SYMPTOMS
NAUSEA: 0
ABDOMINAL PAIN: 0
BLOOD IN STOOL: 1
SHORTNESS OF BREATH: 1
VOMITING: 0

## 2020-01-23 ASSESSMENT — PAIN SCALES - GENERAL: PAINLEVEL_OUTOF10: 0

## 2020-01-23 NOTE — ED TRIAGE NOTES
Presents to ER after having a dark tarry bowel movement yesterday. Pt states she hasn't been feeling right since. States she has been lightheaded and dizzy since yesterday. States she has not had a bowel movement since. Pt noted to be pale in color. VSS. Will continue to monitor.

## 2020-01-23 NOTE — ED PROVIDER NOTES
[facility] Emergency Department    CHIEF COMPLAINT       Chief Complaint   Patient presents with    Rectal Bleeding       Nurses Notes reviewed and I agree except as noted in the HPI. HISTORY OF PRESENT ILLNESS    Sloane Oliveros Class stanley 54 y.o. female who presents to the ED for evaluation of black stools. Patient reports yesterday she felt like she was getting a cold so she went to the store to get some medicine. Patient reports while she was in the store she had sudden onset of diaphoresis and lightheadedness. Patient states she was holding onto the cart but she felt herself fall. Patient states the staff called EMS but patient didn't want to go to the hospital so she called her  to come get her. She states last night she had a bowel movement that was black and tarry. Patient states today she has been getting light headed and pale every time she stands and she has dyspnea on exertion. Patient reports she has only eaten a small amount of soup today. Patient has had a hysterectomy, cholecystectomy, appendectomy, and Gonzalez-en-Y gastric bypass. Patient denies having gastric issues since her gastric bypass. Patient denies hx of gastric ulcers. Patient had a EGD in 2012 before her surgery but denies seeing GI since the surgery. Patient denies chest pain, abdominal pain, nausea, vomiting, or blood thinner. Patient has no other complaints at this time. HPI was provided by the patient. REVIEW OF SYSTEMS     Review of Systems   Constitutional: Positive for diaphoresis. HENT: Negative for congestion. Eyes: Negative for visual disturbance. Respiratory: Positive for shortness of breath (ELIAS). Cardiovascular: Negative for chest pain. Gastrointestinal: Positive for blood in stool (melena). Negative for abdominal pain, nausea and vomiting. Genitourinary: Negative for difficulty urinating. Musculoskeletal: Negative for myalgias. Skin: Positive for pallor.    Neurological: Positive for Not on file    Transportation needs:     Medical: Not on file     Non-medical: Not on file   Tobacco Use    Smoking status: Never Smoker    Smokeless tobacco: Never Used   Substance and Sexual Activity    Alcohol use: Yes     Comment: rare    Drug use: No    Sexual activity: Yes     Partners: Male   Lifestyle    Physical activity:     Days per week: Not on file     Minutes per session: Not on file    Stress: Not on file   Relationships    Social connections:     Talks on phone: Not on file     Gets together: Not on file     Attends [employer] service: Not on file     Active member of club or organization: Not on file     Attends meetings of clubs or organizations: Not on file     Relationship status: Not on file    Intimate partner violence:     Fear of current or ex partner: Not on file     Emotionally abused: Not on file     Physically abused: Not on file     Forced sexual activity: Not on file   Other Topics Concern    Not on file   Social History Narrative    Not on file       PHYSICAL EXAM     INITIAL VITALS:  height is 5' 3\" (1.6 m) and weight is 160 lb (72.6 kg). Her oral temperature is 98.4 °F (36.9 °C). Her blood pressure is 132/72 and her pulse is 114. Her respiration is 15 and oxygen saturation is 100%. Physical Exam  Vitals signs and nursing note reviewed. Constitutional:       Appearance: Normal appearance. She is well-developed. HENT:      Head: Normocephalic. Mouth/Throat:      Pharynx: Uvula midline. Eyes:      Conjunctiva/sclera: Conjunctivae normal.   Neck:      Musculoskeletal: Normal range of motion and neck supple. Cardiovascular:      Rate and Rhythm: Regular rhythm. Tachycardia present. Heart sounds: Normal heart sounds, S1 normal and S2 normal.   Pulmonary:      Effort: Pulmonary effort is normal. No respiratory distress. Breath sounds: Normal breath sounds. Chest:      Chest wall: No tenderness.    Abdominal:      General: Bowel sounds are normal. There is no distension. Palpations: Abdomen is soft. Tenderness: There is no tenderness. Musculoskeletal: Normal range of motion. Lymphadenopathy:      Cervical: No cervical adenopathy. Skin:     General: Skin is warm and dry. Neurological:      Mental Status: She is alert and oriented to person, place, and time. Psychiatric:         Speech: Speech normal.         Behavior: Behavior normal.         Thought Content: Thought content normal.         DIFFERENTIAL DIAGNOSIS:   Included but not limited to: GI bleed, peptic ulcer, bleeding at the anastomoses of a Gonzalez-en-Y,    DIAGNOSTIC RESULTS       RADIOLOGY: non-plainfilm images(s) such as CT, Ultrasound and MRI are read by the radiologist.  Plain radiographic images are visualized and preliminarily interpreted by the emergency physician unless otherwise stated below. No orders to display         LABS:   Labs Reviewed   CBC WITH AUTO DIFFERENTIAL - Abnormal; Notable for the following components:       Result Value    RBC 3.54 (*)     Hemoglobin 10.2 (*)     Hematocrit 30.9 (*)     All other components within normal limits   APTT   PROTIME-INR   BASIC METABOLIC PANEL   HEPATIC FUNCTION PANEL   URINALYSIS WITH MICROSCOPIC   TYPE AND SCREEN       EMERGENCY DEPARTMENT COURSE:   Vitals:    Vitals:    01/23/20 1752   BP: 132/72   Pulse: 114   Resp: 15   Temp: 98.4 °F (36.9 °C)   TempSrc: Oral   SpO2: 100%   Weight: 160 lb (72.6 kg)   Height: 5' 3\" (1.6 m)       MDM    Patient was seen and evaluated in the emergency department, patient appears to be in no acute distress, vital signs were were reviewed, significant tachycardia and slight hypotension were noted. No respiratory distress was noted. Abdomen was soft, no tenderness was noted, no distention was noted. Labs were obtained, no significant anemia was noted, rectal exam occult study reveals positive for blood. No other significant abnormalities were noted.   Based on the patient's vital signs, failure to respond to IV fluids, and blood in her stool I feel she requires admission for further evaluation. I discussed the case with Dr. Rain Ortega, who graciously except the patient for admission. I initiated Protonix, and ordered a repeat H&H for the patient was down in the emergency department. Bev my findings my plan of care with patient she is amenable with admission. Medications   0.9 % sodium chloride bolus (has no administration in time range)       Patient was seenindependently by myself. The patient's final impression and disposition and plan was determined by myself. CRITICAL CARE:   None    CONSULTS:  Dr. Rain Ortega hospitalist    PROCEDURES:  None    FINAL IMPRESSION   No diagnosis found. DISPOSITION/PLAN   Admit    PATIENT REFERREDTO:  No follow-up provider specified. DISCHARGE MEDICATIONS:  New Prescriptions    No medications on file       (Please note that portions of this note were completed with a voice recognition program.  Efforts were made to edit the dictations but occasionally words are mis-transcribed.)    Scribe:  Twyla Iqbal 1/23/20 6:24 PM Scribing for and in the presence of Faisal Carmona CNP. Signed by: Sean Del Castillo, 01/23/20 6:24 PM    Provider:  I personally performed the services described in the documentation,reviewed and edited the documentation which was dictated to the scribe in my presence, and it accurately records my words and actions.     Faisal Carmona CNP 01/23/20 6:24 PM             PAOLO Hopper CNP  01/23/20 1867

## 2020-01-24 ENCOUNTER — APPOINTMENT (OUTPATIENT)
Dept: CT IMAGING | Age: 50
DRG: 378 | End: 2020-01-24
Payer: COMMERCIAL

## 2020-01-24 ENCOUNTER — ANESTHESIA EVENT (OUTPATIENT)
Dept: ENDOSCOPY | Age: 50
DRG: 378 | End: 2020-01-24
Payer: COMMERCIAL

## 2020-01-24 ENCOUNTER — ANESTHESIA (OUTPATIENT)
Dept: ENDOSCOPY | Age: 50
DRG: 378 | End: 2020-01-24
Payer: COMMERCIAL

## 2020-01-24 VITALS
RESPIRATION RATE: 24 BRPM | OXYGEN SATURATION: 100 % | SYSTOLIC BLOOD PRESSURE: 100 MMHG | DIASTOLIC BLOOD PRESSURE: 64 MMHG

## 2020-01-24 LAB
ALBUMIN SERPL-MCNC: 3.2 G/DL (ref 3.5–5.1)
ALP BLD-CCNC: 57 U/L (ref 38–126)
ALT SERPL-CCNC: 8 U/L (ref 11–66)
ANION GAP SERPL CALCULATED.3IONS-SCNC: 10 MEQ/L (ref 8–16)
AST SERPL-CCNC: 12 U/L (ref 5–40)
BACTERIA: ABNORMAL
BILIRUB SERPL-MCNC: 0.3 MG/DL (ref 0.3–1.2)
BILIRUBIN URINE: NEGATIVE
BLOOD, URINE: NEGATIVE
BUN BLDV-MCNC: 16 MG/DL (ref 7–22)
CALCIUM SERPL-MCNC: 8.1 MG/DL (ref 8.5–10.5)
CASTS: ABNORMAL /LPF
CASTS: ABNORMAL /LPF
CHARACTER, URINE: CLEAR
CHLORIDE BLD-SCNC: 109 MEQ/L (ref 98–111)
CO2: 23 MEQ/L (ref 23–33)
COLOR: YELLOW
CREAT SERPL-MCNC: 0.5 MG/DL (ref 0.4–1.2)
CRYSTALS: ABNORMAL
EPITHELIAL CELLS, UA: ABNORMAL /HPF
ERYTHROCYTE [DISTWIDTH] IN BLOOD BY AUTOMATED COUNT: 13.1 % (ref 11.5–14.5)
ERYTHROCYTE [DISTWIDTH] IN BLOOD BY AUTOMATED COUNT: 42.5 FL (ref 35–45)
GFR SERPL CREATININE-BSD FRML MDRD: > 90 ML/MIN/1.73M2
GLUCOSE BLD-MCNC: 91 MG/DL (ref 70–108)
GLUCOSE, URINE: NEGATIVE MG/DL
HCT VFR BLD CALC: 24.4 % (ref 37–47)
HEMOGLOBIN: 7.8 GM/DL (ref 12–16)
HEMOGLOBIN: 8.8 GM/DL (ref 12–16)
HEMOGLOBIN: 9.6 GM/DL (ref 12–16)
KETONES, URINE: NEGATIVE
LEUKOCYTE ESTERASE, URINE: ABNORMAL
MAGNESIUM: 1.7 MG/DL (ref 1.6–2.4)
MCH RBC QN AUTO: 28.9 PG (ref 26–33)
MCHC RBC AUTO-ENTMCNC: 32 GM/DL (ref 32.2–35.5)
MCV RBC AUTO: 90.4 FL (ref 81–99)
MISCELLANEOUS LAB TEST RESULT: ABNORMAL
NITRITE, URINE: NEGATIVE
PH UA: 5.5 (ref 5–9)
PLATELET # BLD: 154 THOU/MM3 (ref 130–400)
PMV BLD AUTO: 10.8 FL (ref 9.4–12.4)
POTASSIUM REFLEX MAGNESIUM: 4 MEQ/L (ref 3.5–5.2)
PROTEIN UA: NEGATIVE MG/DL
RBC # BLD: 2.7 MILL/MM3 (ref 4.2–5.4)
RBC URINE: ABNORMAL /HPF
RENAL EPITHELIAL, UA: ABNORMAL
SODIUM BLD-SCNC: 142 MEQ/L (ref 135–145)
SPECIFIC GRAVITY UA: 1.02 (ref 1–1.03)
TOTAL PROTEIN: 5 G/DL (ref 6.1–8)
UROBILINOGEN, URINE: 0.2 EU/DL (ref 0–1)
WBC # BLD: 6.1 THOU/MM3 (ref 4.8–10.8)
WBC UA: ABNORMAL /HPF
YEAST: ABNORMAL

## 2020-01-24 PROCEDURE — 7100000001 HC PACU RECOVERY - ADDTL 15 MIN: Performed by: INTERNAL MEDICINE

## 2020-01-24 PROCEDURE — 6360000004 HC RX CONTRAST MEDICATION: Performed by: INTERNAL MEDICINE

## 2020-01-24 PROCEDURE — 7100000000 HC PACU RECOVERY - FIRST 15 MIN: Performed by: INTERNAL MEDICINE

## 2020-01-24 PROCEDURE — 3700000001 HC ADD 15 MINUTES (ANESTHESIA): Performed by: INTERNAL MEDICINE

## 2020-01-24 PROCEDURE — 1200000003 HC TELEMETRY R&B

## 2020-01-24 PROCEDURE — 85018 HEMOGLOBIN: CPT

## 2020-01-24 PROCEDURE — 6370000000 HC RX 637 (ALT 250 FOR IP): Performed by: FAMILY MEDICINE

## 2020-01-24 PROCEDURE — 3700000000 HC ANESTHESIA ATTENDED CARE: Performed by: INTERNAL MEDICINE

## 2020-01-24 PROCEDURE — 6360000002 HC RX W HCPCS: Performed by: INTERNAL MEDICINE

## 2020-01-24 PROCEDURE — 80053 COMPREHEN METABOLIC PANEL: CPT

## 2020-01-24 PROCEDURE — 74178 CT ABD&PLV WO CNTR FLWD CNTR: CPT

## 2020-01-24 PROCEDURE — 36415 COLL VENOUS BLD VENIPUNCTURE: CPT

## 2020-01-24 PROCEDURE — 2580000003 HC RX 258: Performed by: INTERNAL MEDICINE

## 2020-01-24 PROCEDURE — 36430 TRANSFUSION BLD/BLD COMPNT: CPT

## 2020-01-24 PROCEDURE — P9016 RBC LEUKOCYTES REDUCED: HCPCS

## 2020-01-24 PROCEDURE — 2580000003 HC RX 258: Performed by: FAMILY MEDICINE

## 2020-01-24 PROCEDURE — 88305 TISSUE EXAM BY PATHOLOGIST: CPT

## 2020-01-24 PROCEDURE — 81001 URINALYSIS AUTO W/SCOPE: CPT

## 2020-01-24 PROCEDURE — C9113 INJ PANTOPRAZOLE SODIUM, VIA: HCPCS | Performed by: INTERNAL MEDICINE

## 2020-01-24 PROCEDURE — 3609012400 HC EGD TRANSORAL BIOPSY SINGLE/MULTIPLE: Performed by: INTERNAL MEDICINE

## 2020-01-24 PROCEDURE — 2709999900 HC NON-CHARGEABLE SUPPLY: Performed by: INTERNAL MEDICINE

## 2020-01-24 PROCEDURE — 85027 COMPLETE CBC AUTOMATED: CPT

## 2020-01-24 PROCEDURE — 99232 SBSQ HOSP IP/OBS MODERATE 35: CPT | Performed by: INTERNAL MEDICINE

## 2020-01-24 PROCEDURE — 88342 IMHCHEM/IMCYTCHM 1ST ANTB: CPT

## 2020-01-24 PROCEDURE — 6360000002 HC RX W HCPCS: Performed by: NURSE ANESTHETIST, CERTIFIED REGISTERED

## 2020-01-24 PROCEDURE — 2500000003 HC RX 250 WO HCPCS: Performed by: NURSE ANESTHETIST, CERTIFIED REGISTERED

## 2020-01-24 PROCEDURE — 83735 ASSAY OF MAGNESIUM: CPT

## 2020-01-24 PROCEDURE — 2580000003 HC RX 258: Performed by: PHYSICIAN ASSISTANT

## 2020-01-24 RX ORDER — SODIUM CHLORIDE 0.9 % (FLUSH) 0.9 %
10 SYRINGE (ML) INJECTION PRN
Status: DISCONTINUED | OUTPATIENT
Start: 2020-01-24 | End: 2020-01-24 | Stop reason: SDUPTHER

## 2020-01-24 RX ORDER — LIDOCAINE HYDROCHLORIDE 20 MG/ML
INJECTION, SOLUTION INFILTRATION; PERINEURAL PRN
Status: DISCONTINUED | OUTPATIENT
Start: 2020-01-24 | End: 2020-01-24 | Stop reason: SDUPTHER

## 2020-01-24 RX ORDER — 0.9 % SODIUM CHLORIDE 0.9 %
20 INTRAVENOUS SOLUTION INTRAVENOUS ONCE
Status: DISCONTINUED | OUTPATIENT
Start: 2020-01-24 | End: 2020-01-25

## 2020-01-24 RX ORDER — SODIUM CHLORIDE 0.9 % (FLUSH) 0.9 %
10 SYRINGE (ML) INJECTION PRN
Status: DISCONTINUED | OUTPATIENT
Start: 2020-01-24 | End: 2020-01-27 | Stop reason: HOSPADM

## 2020-01-24 RX ORDER — 0.9 % SODIUM CHLORIDE 0.9 %
1000 INTRAVENOUS SOLUTION INTRAVENOUS ONCE
Status: COMPLETED | OUTPATIENT
Start: 2020-01-24 | End: 2020-01-24

## 2020-01-24 RX ORDER — PANTOPRAZOLE SODIUM 40 MG/1
40 TABLET, DELAYED RELEASE ORAL
Status: DISCONTINUED | OUTPATIENT
Start: 2020-01-24 | End: 2020-01-27 | Stop reason: HOSPADM

## 2020-01-24 RX ORDER — SODIUM CHLORIDE 0.9 % (FLUSH) 0.9 %
10 SYRINGE (ML) INJECTION EVERY 12 HOURS SCHEDULED
Status: DISCONTINUED | OUTPATIENT
Start: 2020-01-24 | End: 2020-01-27 | Stop reason: HOSPADM

## 2020-01-24 RX ORDER — SODIUM CHLORIDE 450 MG/100ML
INJECTION, SOLUTION INTRAVENOUS CONTINUOUS
Status: DISCONTINUED | OUTPATIENT
Start: 2020-01-24 | End: 2020-01-25

## 2020-01-24 RX ORDER — SODIUM CHLORIDE 0.9 % (FLUSH) 0.9 %
10 SYRINGE (ML) INJECTION EVERY 12 HOURS SCHEDULED
Status: DISCONTINUED | OUTPATIENT
Start: 2020-01-24 | End: 2020-01-24 | Stop reason: SDUPTHER

## 2020-01-24 RX ADMIN — ACETAMINOPHEN 650 MG: 325 TABLET ORAL at 13:52

## 2020-01-24 RX ADMIN — Medication 10 ML: at 09:00

## 2020-01-24 RX ADMIN — SODIUM CHLORIDE: 4.5 INJECTION, SOLUTION INTRAVENOUS at 15:06

## 2020-01-24 RX ADMIN — SODIUM CHLORIDE: 9 INJECTION, SOLUTION INTRAVENOUS at 17:52

## 2020-01-24 RX ADMIN — IOPAMIDOL 80 ML: 755 INJECTION, SOLUTION INTRAVENOUS at 20:43

## 2020-01-24 RX ADMIN — ACETAMINOPHEN 650 MG: 325 TABLET ORAL at 21:01

## 2020-01-24 RX ADMIN — SODIUM CHLORIDE 80 MG: 9 INJECTION, SOLUTION INTRAVENOUS at 09:00

## 2020-01-24 RX ADMIN — LIDOCAINE HYDROCHLORIDE 100 MG: 20 INJECTION, SOLUTION INFILTRATION; PERINEURAL at 15:07

## 2020-01-24 RX ADMIN — SODIUM CHLORIDE 1000 ML: 9 INJECTION, SOLUTION INTRAVENOUS at 21:57

## 2020-01-24 RX ADMIN — SODIUM CHLORIDE 8 MG/HR: 9 INJECTION, SOLUTION INTRAVENOUS at 09:01

## 2020-01-24 RX ADMIN — PROPOFOL 180 MG: 10 INJECTION, EMULSION INTRAVENOUS at 15:07

## 2020-01-24 ASSESSMENT — PAIN SCALES - GENERAL
PAINLEVEL_OUTOF10: 5
PAINLEVEL_OUTOF10: 0
PAINLEVEL_OUTOF10: 4
PAINLEVEL_OUTOF10: 0
PAINLEVEL_OUTOF10: 8

## 2020-01-24 ASSESSMENT — PAIN DESCRIPTION - PROGRESSION: CLINICAL_PROGRESSION: GRADUALLY IMPROVING

## 2020-01-24 ASSESSMENT — PAIN DESCRIPTION - DESCRIPTORS: DESCRIPTORS: STABBING

## 2020-01-24 ASSESSMENT — PAIN DESCRIPTION - LOCATION: LOCATION: HEAD

## 2020-01-24 ASSESSMENT — PAIN - FUNCTIONAL ASSESSMENT
PAIN_FUNCTIONAL_ASSESSMENT: ACTIVITIES ARE NOT PREVENTED
PAIN_FUNCTIONAL_ASSESSMENT: 0-10

## 2020-01-24 ASSESSMENT — PAIN DESCRIPTION - ONSET: ONSET: ON-GOING

## 2020-01-24 ASSESSMENT — PAIN DESCRIPTION - FREQUENCY: FREQUENCY: CONTINUOUS

## 2020-01-24 ASSESSMENT — PAIN DESCRIPTION - PAIN TYPE: TYPE: ACUTE PAIN

## 2020-01-24 NOTE — ED NOTES
Pt ambulated to bathroom and back to bed, pt states she still feels lightheaded when getting up but feels it is much better than before. Pt back in bed at this time, respirations unlabored. Call light in reach.      Helena Gomez RN  01/23/20 2022

## 2020-01-24 NOTE — CARE COORDINATION
1/24/20, 8:22 AM  DISCHARGE PLANNING EVALUATION:    Sloane Bustillo       Admitted from: ER 1/23/2020/ [facility] day: 1   Location: [address] [place] [address] Reason for admit: GIB (gastrointestinal bleeding) [K92.2] Status: IP   Admit order signed?: yes  PMH:  has a past medical history of Arthritis, Liver disease, and PONV (postoperative nausea and vomiting). Medications:  Scheduled Meds:   pantoprazole (PROTONIX) bolus  80 mg Intravenous Once    vitamin B-12  1,000 mcg Oral Daily    sodium chloride flush  10 mL Intravenous 2 times per day     Continuous Infusions:   pantoprozole (PROTONIX) infusion      sodium chloride 75 mL/hr at 01/23/20 2221      Pertinent Info/Orders/Treatment Plan:  Hgb 7.8 (was 10.2 yesterday), occult stool+. IVF, protonix gtt. Await GI consult. Diet: Diet NPO Effective Now   Smoking status:  reports that she has never smoked. She has never used smokeless tobacco.   PCP: No primary care provider on file. Readmission 30 days or less: no  Readmission Risk Score: 5%    Discharge Planning Evaluation  Current Residence:  Private Residence  Living Arrangements:  Spouse/Significant Other, Children   Support Systems:  Children, Family Members  Current Services PTA:     Potential Assistance Needed:  N/A  Potential Assistance Purchasing Medications:  No  Does patient want to participate in local refill/ meds to beds program?  No  Type of Home Care Services:  None  Patient expects to be discharged to:  home with family  Expected Discharge date:  01/26/20  Follow Up Appointment: Best Day/ Time: Wednesday PM    Patient Goals/Plan/Treatment Preferences: Spoke with Sloane, she plans return home with family at discharge. She is employed as a nurse. [facility], denies needs for discharge. Transportation/Food Security/Housekeeping Addressed:  No issues identified.     Evaluation: no

## 2020-01-24 NOTE — CONSULTS
Pt Name: Dmitry Askew  MRN: [id number]  [id number]  YOB: 1970  Admit Date: 2020  5:43 PM  Date of evaluation: 2020  Primary Care Physician: No primary care provider on file. -A   Dictating for Dr Varun Pimentel    Requesting Provider:   Chapo Tineo MD    [place] Consult    Indication:  Melena, +FOBT, evaluate GI bleed    History:  The patient is a 54 y.o. female admitted 19 for melena. Pt states Wednesday evenign she was nauseated so she went to [address] [place] [address] to get medication. There she felt very nauseated, lightheaded, and diaphoretic. She actually had to be assisted to bench. She then went home and about 3 hours later had a large black colored BM. She has not had any since. However when she stands she is lightheaded still. She reported that she did orthostatics at home nad had significant tachycardia with this. She then presented to ER yesterday due to symptoms. She denies abd pain, vomiting, GERD, chest pain, SOB, hematochezia, diarrhea, constipation, change in bowel habits or dysphagia. She takes NSAIDS 1 to 2 times per week. She has hx gastric bypass surgery in  and states she had EGD and colonoscopy at that time in [place]. Active Hospital Problems    Diagnosis Date Noted    GIB (gastrointestinal bleeding) [K92.2] 2020     Past Medical History:        Diagnosis Date    Arthritis     Liver disease     PONV (postoperative nausea and vomiting)      Past Surgical History:        Procedure Laterality Date    APPENDECTOMY       SECTION      CHOLECYSTECTOMY      EPIDURAL STEROID INJECTION N/A 2019    [facility]ID[facility]ERO[facility] C6 [facility]      right lower leg cadaver bone    HYSTERECTOMY      SHELLY-EN-Y GASTRIC BYPASS       Allergies:  Patient has no known allergies.   Home Meds:  Prior to Admission medications of education: Not on file    Highest education level: Not on file   Occupational History    Not on file   Social Needs    Financial resource strain: Not on file    Food insecurity:     Worry: Not on file     Inability: Not on file    Transportation needs:     Medical: Not on file     Non-medical: Not on file   Tobacco Use    Smoking status: Never Smoker    Smokeless tobacco: Never Used   Substance and Sexual Activity    Alcohol use: Yes     Comment: rare    Drug use: No    Sexual activity: Yes     Partners: Male   Lifestyle    Physical activity:     Days per week: Not on file     Minutes per session: Not on file    Stress: Not on file   Relationships    Social connections:     Talks on phone: Not on file     Gets together: Not on file     Attends Congregational service: Not on file     Active member of club or organization: Not on file     Attends meetings of clubs or organizations: Not on file     Relationship status: Not on file    Intimate partner violence:     Fear of current or ex partner: Not on file     Emotionally abused: Not on file     Physically abused: Not on file     Forced sexual activity: Not on file   Other Topics Concern    Not on file   Social History Narrative    Not on file     Family History:   No GI malignancies   Family History   Problem Relation Age of Onset    Cancer Maternal Grandmother 61        lung - smoker    Diabetes Father     Heart Disease Father         CABG    High Blood Pressure Father     Prostate Cancer Maternal Grandfather     Stroke Neg Hx        ROS:  General : no fever chills or weight loss   Eyes: No  loss of vision  ENT: No  vocal hoarseness   Cardiovascular: No chest pain. Respiratory: No cough, SOB  GI:+melena, nausea. No , hematochezia, constipation, or diarrhea. : No dysuria  GYN: No abnormal menstrual bleeding  Musculoskeletal: No new painful or swollen joints.    Skin: No rashes, jaundicer  Neurologic: no frequent headaches, migraines  Emotional:

## 2020-01-24 NOTE — BRIEF OP NOTE
Brief Postoperative Note  ______________________________________________________________    Patient: Andria Gomez  YOB: 1970  MRN: [id number]  Date of Procedure: 1/24/2020    Pre-Op Diagnosis: GI Bleed    Post-Op Diagnosis: Same:  Distal esophagitis,  Gastritis in small gastric remnanat. Small bowel entered for 20-25 cm; No active bleeding or bleeding source. Will resume diet,  Change to Protonix BID; And consider colon evaluation (and possible small bowel capsule endoscopy). Since no upper source identified, will check CT scan abdomen and pelvis tomorrow. Procedure(s):  EGD BIOPSY    Anesthesia: Monitor Anesthesia Care    Surgeon(s):   Mitzi Rubinstein, MD    Assistant: none    Estimated Blood Loss (mL): less than 50     Complications: None    Specimens:   ID Type Source Tests Collected by Time Destination   A : gastritis Tissue Stomach SURGICAL PATHOLOGY Mitzi Rubinstein, MD 1/24/2020 1513        Implants:  * No implants in log *      Drains: * No LDAs found *    Findings: above    Mitzi Rubinstein, MD  Date: 1/24/2020  Time: 3:29 PM

## 2020-01-24 NOTE — ED NOTES
ED nurse-to-nurse bedside report    Chief Complaint   Patient presents with    Rectal Bleeding      LOC: alert and orientated to name, place, date  Vital signs   Vitals:    01/23/20 1752 01/23/20 1926 01/23/20 2021   BP: 132/72 101/69 107/64   Pulse: 114 98 117   Resp: 15 16 17   Temp: 98.4 °F (36.9 °C)     TempSrc: Oral     SpO2: 100% 100% 100%   Weight: 160 lb (72.6 kg)     Height: 5' 3\" (1.6 m)        Pain:  4  Pain Interventions: comfort  Pain Goal: 4  Oxygen: NA    Current needs required none   Telemetry: Yes  LDAs:   Peripheral IV 01/23/20 Left Antecubital (Active)   Site Assessment Clean;Dry; Intact 1/23/2020  5:56 PM   Line Status Infusing 1/23/2020  5:56 PM   Dressing Status Clean;Dry; Intact 1/23/2020  5:56 PM     Continuous Infusions:   Mobility: Requires assistance * 1  Fall Interventions: call light in reach  Report given to: Esperanza Clarke, RN  01/23/20 2037

## 2020-01-24 NOTE — ANESTHESIA PRE PROCEDURE
10 mL Intravenous 2 times per day Shilpi Jarvis MD   10 mL at 20 0900    sodium chloride flush 0.9 % injection 10 mL  10 mL Intravenous PRN Jerrica Barrera MD        magnesium hydroxide (MILK OF MAGNESIA) 400 MG/5ML suspension 30 mL  30 mL Oral Daily PRN Jerrica Barrera MD        ondansetron (ZOFRAN) injection 4 mg  4 mg Intravenous Q6H PRN Jerrica Barrera MD        acetaminophen (TYLENOL) tablet 650 mg  650 mg Oral Q4H PRN Jerrica Barrera MD   650 mg at 20 1352    0.9 % sodium chloride infusion   Intravenous Continuous Jerrica Barrera MD 75 mL/hr at 20 2221         Allergies:  No Known Allergies    Problem List:    Patient Active Problem List   Diagnosis Code    Acute appendicitis K35.80    Cervical radiculitis M54.12    Cervical spinal stenosis M48.02    GIB (gastrointestinal bleeding) K92.2       Past Medical History:        Diagnosis Date    Arthritis     Liver disease     PONV (postoperative nausea and vomiting)        Past Surgical History:        Procedure Laterality Date    APPENDECTOMY       SECTION      CHOLECYSTECTOMY      EPIDURAL STEROID INJECTION N/A 2019    EPIDURAL STEROID INJECTION BO @ C6 LEFT performed by Arlin Feliz MD at [address] [place]      right lower leg cadaver bone    HYSTERECTOMY      SHELLY-EN-Y GASTRIC BYPASS         Social History:    Social History     Tobacco Use    Smoking status: Never Smoker    Smokeless tobacco: Never Used   Substance Use Topics    Alcohol use: Yes     Comment: rare                                Counseling given: Not Answered      Vital Signs (Current):   Vitals:    20 0847 20 1111 20 1132 20 1443   BP: 109/64 (!) 102/58 102/70 102/69   Pulse: 104 104 102 106   Resp: 16 16 16 14   Temp: 36.3 °C (97.3 °F) 36.7 °C (98 °F) 36.7 °C (98.1 °F)    TempSrc: Oral Oral Oral    SpO2: 99% 98% 99% 100%   Weight:       Height:                                                  BP Readings from Last 3 Encounters:   01/24/20 102/69   04/30/19 128/80   04/30/19 118/70       NPO Status: Time of last liquid consumption: 1330                        Time of last solid consumption: 1800                        Date of last liquid consumption: 01/24/20                        Date of last solid food consumption: 01/23/20    BMI:   Wt Readings from Last 3 Encounters:   01/23/20 166 lb 9.6 oz (75.6 kg)   04/30/19 162 lb 6.4 oz (73.7 kg)   04/03/19 165 lb (74.8 kg)     Body mass index is 29.51 kg/m². CBC:   Lab Results   Component Value Date    WBC 6.1 01/24/2020    RBC 2.70 01/24/2020    HGB 8.8 01/24/2020    HCT 24.4 01/24/2020    MCV 90.4 01/24/2020    RDW 12.9 12/13/2012     01/24/2020       CMP:   Lab Results   Component Value Date     01/24/2020    K 4.0 01/24/2020     01/24/2020    CO2 23 01/24/2020    BUN 16 01/24/2020    CREATININE 0.5 01/24/2020    LABGLOM >90 01/24/2020    GLUCOSE 91 01/24/2020    PROT 5.0 01/24/2020    CALCIUM 8.1 01/24/2020    BILITOT 0.3 01/24/2020    ALKPHOS 57 01/24/2020    AST 12 01/24/2020    ALT 8 01/24/2020       POC Tests: No results for input(s): POCGLU, POCNA, POCK, POCCL, POCBUN, POCHEMO, POCHCT in the last 72 hours. Coags:   Lab Results   Component Value Date    INR 1.09 01/23/2020    APTT 31.3 01/23/2020       HCG (If Applicable): No results found for: PREGTESTUR, PREGSERUM, HCG, HCGQUANT     ABGs: No results found for: PHART, PO2ART, OEU5CGJ, BHM0WDO, BEART, C3CHLMCJ     Type & Screen (If Applicable):  Lab Results   Component Value Date    [address] 01/23/2020       Anesthesia Evaluation  Patient summary reviewed   history of anesthetic complications: PONV.   Airway: Mallampati: I  TM distance: >3 FB   Neck ROM: limited  Mouth opening: > = 3 FB Dental: normal exam         Pulmonary:Negative Pulmonary ROS and normal exam Cardiovascular:Negative CV ROS                      Neuro/Psych:   (+) neuromuscular disease:,             GI/Hepatic/Renal:   (+) liver disease:,           Endo/Other: Negative Endo/Other ROS                    Abdominal:           Vascular: negative vascular ROS. Anesthesia Plan      MAC     ASA 2       Induction: intravenous. Anesthetic plan and risks discussed with patient. Plan discussed with attending.     Attending anesthesiologist reviewed and agrees with [address] [place] [address], APRN - CRNA   1/24/2020

## 2020-01-24 NOTE — H&P
History & Physical       Patient: Mahendra Rivera  YOB: 1970    MRN: [id number]     Acct: [de-identified]    PCP: No primary care provider on file. Date of Admission: 1/23/2020    Date of Service: Patient seen / examined on 01/23/20 and admitted to inpatient with expected LOS greater than two midnights due to medical therapy. ASSESSMENT / PLAN:    Melena / concern for UGIB  No known personal history of GERD/PUD. FOBT positive in the ED. Patient is hemodynamically stable. Denies abdominal pain. Hgb 8.9 (down from 10.2 earlier this evening). Admit to medical floor with telemetry monitoring. GI consulted / will keep NPO for EGD (and possible colonoscopy). PPI gtt. IVF overnight. Monitor stool output. Management of anemia per below. Orthostatic hypotension  Patient was orthostat positive prior to ED arrival. Suspect secondary acute blood loss and volume depletion 2/2 GIB. IVF. Up with assistance / fall precautions. Repeat orthostatic vitals ordered for the AM.    Sinus tachycardia  Suspect secondary to acute blood loss anemia, volume depletion. IVF. Monitor on telemetry. History of Gonzalez-en-Y gastric bypass (2012)  With subsequent 100-lb weight loss. Resume vitamin B12 supplementation. Hold iron supplementation for scope / resume when indicated. Normocytic anemia  Per EMR, new since 2013. Suspect acute on chronic with new GIB and history of Gonzalez-en-Y gastric bypass in 2012. Repeat CBC in the AM. Patient was typed/crossed in the ED / plan to transfuse for Hgb < 7 or signs of hemodynamic instability. Chief Complaint: melena    History of Present Illness:  55 y/o F PMHx obesity (s/p Gonzalez-en-Y gastric bypass 2012) -- presents to [facility] with a chief complaint of melena. History obtained from the patient.     Patient reports starting to \"not feel well\" yesterday afternoon / thought she might be coming down with the flu / drove to [employer] to purchase some [employer] / developed sudden-onset lightheadedness, weakness and diaphoresis at the checkout counter / required assistance to lie down / rested until improved. Patient returned home / experienced recurrence of symptoms with standing/ambulation to the bathroom / took her own set of vitals (works as an RN at [employer]) / states she was orthostat positive with an increase in HR noted from 110s to >150s. She subsequently went to the bathroom and noticed a black stool / states this has never happened before (despite taking chronic oral iron supplementation daily) / no recent pepto bismol use / no associated bright red blood in the stool or toilet water. She decided to proceed to the ED for evaluation due to concern regarding an upper GIB. Of note, she hasn't had another bowel movement since that time    Patient admits to feeling short of breath during her episodes of lightheadedness, but denies associated syncope. Denies feeling short of breath currently. She also denies recent fevers/chills, headaches, chest pain, palpitations, nausea/emesis, abdominal pain or urinary symptoms. Denies history of GERD/PUD or GIB. Denies outpatient use of NSAIDs, antiplatelet therapy or anticoagulation. She is hoping that this upper GI bleeding doesn't represent a complication from her gastric bypass surgery. She has no additional questions / concerns at this time. ED course: afebrile, hemodynamically appropriate on RA. Workup revealed: Hgb 10.2 (MCV 87.3), WBC nml. BMP wnl. Renal function nml. Hepatic function panel grossly unremarkable. Coags normal. FOBT positive. No EKG / imaging performed in the ED. Hospitalist service contacted for admission.     Past Medical History:    Diagnosis Date   Gonzalez-en-Y gastric bypass    Past Surgical History:    Procedure Laterality Date    APPENDECTOMY       SECTION      CHOLECYSTECTOMY      EPIDURAL STEROID INJECTION N/A 2019    EPIDURAL STEROID INJECTION BO @ C6 LEFT performed by Karol Garza MD at [address] [place], [place] [address]      right lower leg cadaver bone    HYSTERECTOMY      SHELLY-EN-Y GASTRIC BYPASS        Medications Prior to Admission:   Medication Sig    cyclobenzaprine (FLEXERIL) 5 MG tablet Take 5 mg by mouth 3 times daily as needed for Muscle spasms (only taking nightly)    Prenatal Multivit-Min-Fe-FA (PRE-SUNDAY FORMULA PO) Take by mouth 2 times daily    ferrous sulfate 325 (65 FE) MG tablet Take 325 mg by mouth daily (with breakfast).  Calcium Carbonate-Vitamin D (CALCIUM + D PO) Take by mouth nightly     Cyanocobalamin (VITAMIN B-12 CR) 1000 MCG TBCR Take by mouth daily     naproxen (NAPROSYN) 500 MG tablet Take 1,000 mg by mouth 2 times daily as needed      Allergies:   Patient has no known allergies. Social History:   Socioeconomic History    Marital status:    Tobacco Use    Smoking status: Never Smoker    Smokeless tobacco: Never Used   Substance and Sexual Activity    Alcohol use: Yes     Comment: rare    Drug use: No     Family History:   Problem Relation Age of Onset    Cancer Maternal Grandmother 61        lung - smoker    Diabetes Father     Heart Disease Father         CABG    High Blood Pressure Father     Prostate Cancer Maternal Grandfather     Stroke Neg Hx      REVIEW OF SYSTEMS:  A 14-point ROS was obtained and negative, with the exception of pertinent positives as stated in the HPI. PHYSICAL EXAM:  Vitals:    20 1752 20 1926 20   BP: 132/72 101/69 107/64   Pulse: 114 98 117   Resp: 15 16 17   Temp: 98.4 °F (36.9 °C)     TempSrc: Oral     SpO2: 100% 100% 100%   Weight: 160 lb (72.6 kg)     Height: 5' 3\" (1.6 m)     RA    General appearance: Alert / well-appearing  female. Pleasant. Cooperative. NAD. HEENT: Normocephalic / atraumatic. EOMI. Conjunctivae appear normal.  Neck: Supple. No JVD. Respiratory: Normal respiratory effort on RA. CTAB.  No wheezes

## 2020-01-24 NOTE — OP NOTE
[facility]  Sedation/Analgesia History & Physical    Patient: Juan A Anne : 1970  Med Rec#: [id number] Acc#: [id number]   Provider Performing Procedure: Viola Melendez  Primary Care Physician: Taylor Fierro MD    PRE-PROCEDURE   Full CODE [x]Yes  DNR-CCA/DNR-CC []Yes   Brief History/Pre-Procedure Diagnosis:gi bleed          MEDICAL HISTORY  []CAD/Valve  []Liver Disease  []Lung Disease []Diabetes  []Hypertension []Renal Disease  []Additional information:       has a past medical history of Arthritis, Liver disease, and PONV (postoperative nausea and vomiting). SURGICAL HISTORY   has a past surgical history that includes Hysterectomy; Cholecystectomy; Gonzalez-en-Y Gastric Bypass (); Facial reconstruction surgery;  section (); Appendectomy; fracture surgery; and epidural steroid injection (N/A, 2019).   Additional information:       ALLERGIES   Allergies as of 2020    (No Known Allergies)     Additional information:       MEDICATIONS   Coumadin Use Last 7 Days [x]No []Yes  Antiplatelet drug therapy use last 7 days  [x]No []Yes  Other anticoagulant use last 7 days  [x]No []Yes    Current Facility-Administered Medications:     pantoprazole (PROTONIX) 80 mg in sodium chloride 0.9 % 100 mL infusion, 8 mg/hr, Intravenous, Continuous, Viola Melendez MD, Last Rate: 10 mL/hr at 20 09, 8 mg/hr at 20 09    0.9 % sodium chloride bolus, 20 mL, Intravenous, Once, PAOLO Summers - CNP    0.45 % sodium chloride infusion, , Intravenous, Continuous, Viola Melendez MD    sodium chloride flush 0.9 % injection 10 mL, 10 mL, Intravenous, 2 times per day, Viola Melendez MD    sodium chloride flush 0.9 % injection 10 mL, 10 mL, Intravenous, PRN, Viola Melendez MD    vitamin B-12 (CYANOCOBALAMIN) tablet 1,000 mcg, 1,000 mcg, Oral, Daily, Jerrica Barrera MD    sodium chloride flush 0.9 % injection 10 mL, 10 mL, Intravenous, 2 times per day, Brenda Kidd MD, 10 mL at 20 0900    sodium chloride flush 0.9 % injection 10 mL, 10 mL, Intravenous, PRN, Jerrica Barrera MD    magnesium hydroxide (MILK OF MAGNESIA) 400 MG/5ML suspension 30 mL, 30 mL, Oral, Daily PRN, Jerrica Barrera MD    ondansetron (ZOFRAN) injection 4 mg, 4 mg, Intravenous, Q6H PRN, Jerrica Barrera MD    acetaminophen (TYLENOL) tablet 650 mg, 650 mg, Oral, Q4H PRN, Jerrica Barrera MD, 650 mg at 20 1352    0.9 % sodium chloride infusion, , Intravenous, Continuous, Jerrica Barrera MD, Last Rate: 75 mL/hr at 20 2221  Prior to Admission medications    Medication Sig Start Date End Date Taking? Authorizing Provider   cyclobenzaprine (FLEXERIL) 5 MG tablet Take 5 mg by mouth 3 times daily as needed for Muscle spasms (only taking nightly)   Yes Historical Provider, MD   Prenatal Multivit-Min-Fe-FA (PRE-SUNDAY FORMULA PO) Take by mouth 2 times daily   Yes Historical Provider, MD   ferrous sulfate 325 (65 FE) MG tablet Take 325 mg by mouth daily (with breakfast).      Yes Historical Provider, MD   Calcium Carbonate-Vitamin D (CALCIUM + D PO) Take by mouth nightly    Yes Historical Provider, MD   Cyanocobalamin (VITAMIN B-12 CR) 1000 MCG TBCR Take by mouth daily    Yes Historical Provider, MD   naproxen (NAPROSYN) 500 MG tablet Take 1,000 mg by mouth 2 times daily as needed     Historical Provider, MD     Additional information:       PHYSICAL:   Heart:  [x]Regular rate and rhythm  []Other:    Lungs:  [x]Clear    []Other:    Abdomen: [x]Soft    []Other:    Mental Status: [x]Alert & Oriented  []Other:      VITAL SIGNS   Patient Vitals for the past 24 hrs:   BP Temp Temp src Pulse Resp SpO2 Height Weight   20 1443 102/69 -- -- 106 14 100 % -- --   20 1132 102/70 98.1 °F (36.7 °C) Oral 102 16 99 % -- --   20 1111 (!) 102/58 98 °F (36.7 °C) Oral 104 16 98 % -- --   20 0847 109/64 97.3 °F (36.3 °C) Oral 104 16 99 % -- --   01/24/20 0400 (!) 92/54 98.6 °F (37 °C) Oral 113 16 98 % -- --   01/23/20 2210 97/70 97.1 °F (36.2 °C) Oral 104 14 99 % 5' 3\" (1.6 m) 166 lb 9.6 oz (75.6 kg)   01/23/20 2142 103/74 -- -- 121 14 100 % -- --   01/23/20 2102 105/66 -- -- 123 19 100 % -- --   01/23/20 2021 107/64 -- -- 117 17 100 % -- --   01/23/20 1926 101/69 -- -- 98 16 100 % -- --   01/23/20 1752 132/72 98.4 °F (36.9 °C) Oral 114 15 100 % 5' 3\" (1.6 m) 160 lb (72.6 kg)       PLANNED PROCEDURE   [x]EGD  []Colonoscopy []Flex Sigmoid  []ERCP []EUS   []Cystoscopy  [] CATH [] BRONCH   Consent: I have discussed with the patient and/or the patient representative the indication, alternatives, and the possible risks and/or complications of the planned procedure and the anesthesia methods. The patient and/or patient representative appear to understand and agree to proceed. SEDATION  Planned agent:[x]Midazolam []Meperidine [x]Sublimaze []Morphine  []Diazepam  []Other:     ASA Classification: Class 3 - A patient with severe systemic disease that limits activity but is not incapacitating    Airway Assessment: Mallampati Class II - (soft palate, fauces & uvula are visible)    Monitoring and Safety: The patient will be placed on a cardiac monitor and vital signs, pulse oximetry and level of consciousness will be continuously evaluated throughout the procedure. The patient will be closely monitored until recovery from the medications is complete and the patient has returned to baseline status. Respiratory therapy will be on standby during the procedure. [x]Pre-procedure diagnostic studies complete and results available. Comment:    [x]Previous sedation/anesthesia experiences assessed. Comment:    [x]The patient is an appropriate candidate to undergo the planned procedure sedation and anesthesia.  (Refer to nursing sedation/analgesia documentation record)  [x]Formulation and discussion of sedation/procedure plan, risks, and expectations with patient

## 2020-01-24 NOTE — PROGRESS NOTES
Hospitalist Progress Note    Patient:  Elias Alvarado    YOB: 1970    MRN: [id number]       Acct: [de-identified]     PCP: No primary care provider on file. Date of Admission: 1/23/2020    Assessment/Plan:    Melena with acute upper GI bleed suspected: Denies taking regular NSAIDs, history of gastric bypass, cannot rule out ulcer  -GI on board, patient n.p.o., anticipating EGD later this afternoon       Acute blood loss anemia: Patient did not have any recent lab work done in the last few years, not sure about baseline hemoglobin, states it is always in normal range, on admission Hemoglobin 10.2, dropped to 7.8, blood pressure on the low side, GI giving 1 unit of PRBC , there could be some mild dilutional component too    Hypotension secondary to blood loss: 1 unit PRBC, IV fluids    History of gastric bypass surgery, 5025  Nonalcoholic fatty liver   Degenerative cervical disc disease -apparently has 3 ruptured disks   Follows with the pain management, taking NSAIDs occasionally      Expected discharge date: 1 to 2 days    Disposition:    [x] Home       [] TCU       [] Rehab       [] Psych       [] SNF       [] [facility]       [] Other-    Chief Complaint: Melena, lightheadedness       Patient presented to the emergency room with chief complaint of black stool and lightheadedness with tachycardia at home. 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   She kind of knew that it was abnormal and possibly bleeding but did not come right away and in the morning when she stood up she felt lightheaded and she checked her vitals and became tachycardic and came to the murmurs,   Abdomen - soft, nontender, distended, no masses or organomegaly, BS+  Neurological - alert, oriented, normal speech, sensations intact and able to move all extremities   Extremities - peripheral pulses normal, no pedal edema,  Capillary refill less than 3 sec  Skin - normal coloration and turgor, no rashes      Labs:   Recent Labs     01/23/20  1756 01/23/20  2107 01/24/20  0652   WBC 8.9  --  6.1   HGB 10.2* 8.9* 7.8*   HCT 30.9* 27.5* 24.4*     --  154     Recent Labs     01/23/20  1756 01/24/20  0652    142   K 3.9 4.0    109   CO2 26 23   BUN 21 16   CREATININE 0.7 0.5   CALCIUM 8.8 8.1*     Recent Labs     01/23/20  1756 01/24/20  0652   AST 13 12   ALT 10* 8*   BILIDIR <0.2  --    BILITOT 0.3 0.3   ALKPHOS 75 57     Recent Labs     01/23/20  1854   INR 1.09     No results for input(s): Man[place] Dingess in the last 72 hours.     Microbiology:      Urinalysis:      Lab Results   Component Value Date    NITRU NEGATIVE 01/24/2020    WBCUA 25-50 01/24/2020    BACTERIA NONE SEEN 01/24/2020    RBCUA NONE SEEN 01/24/2020    BLOODU NEGATIVE 01/24/2020    SPECGRAV 1.023 01/24/2020    GLUCOSEU NEGATIVE 12/12/2012       Radiology:  No orders to display       DVT prophylaxis: [] Lovenox                                 [x] SCDs                                 [] SQ Heparin                                 [] Encourage ambulation           [] Already on Anticoagulation     Code Status: Full Code    Tele:   [x] yes             [] no    Active Hospital Problems    Diagnosis Date Noted    GIB (gastrointestinal bleeding) [K92.2] 01/23/2020       Electronically signed by Viky Daniel MD on 1/24/2020 at 10:08 AM

## 2020-01-25 ENCOUNTER — APPOINTMENT (OUTPATIENT)
Dept: INTERVENTIONAL RADIOLOGY/VASCULAR | Age: 50
DRG: 378 | End: 2020-01-25
Payer: COMMERCIAL

## 2020-01-25 LAB
ANION GAP SERPL CALCULATED.3IONS-SCNC: 10 MEQ/L (ref 8–16)
BACTERIA: ABNORMAL /HPF
BASOPHILS # BLD: 0.3 %
BASOPHILS ABSOLUTE: 0 THOU/MM3 (ref 0–0.1)
BILIRUBIN URINE: NEGATIVE
BLOOD, URINE: NEGATIVE
BUN BLDV-MCNC: 10 MG/DL (ref 7–22)
CALCIUM SERPL-MCNC: 8 MG/DL (ref 8.5–10.5)
CASTS 2: ABNORMAL /LPF
CASTS UA: ABNORMAL /LPF
CHARACTER, URINE: CLEAR
CHLORIDE BLD-SCNC: 110 MEQ/L (ref 98–111)
CO2: 23 MEQ/L (ref 23–33)
COLOR: YELLOW
CREAT SERPL-MCNC: 0.5 MG/DL (ref 0.4–1.2)
CRYSTALS, UA: ABNORMAL
EKG ATRIAL RATE: 119 BPM
EKG P AXIS: 56 DEGREES
EKG P-R INTERVAL: 172 MS
EKG Q-T INTERVAL: 310 MS
EKG QRS DURATION: 70 MS
EKG QTC CALCULATION (BAZETT): 436 MS
EKG R AXIS: 18 DEGREES
EKG T AXIS: 18 DEGREES
EKG VENTRICULAR RATE: 119 BPM
EOSINOPHIL # BLD: 1.1 %
EOSINOPHILS ABSOLUTE: 0.1 THOU/MM3 (ref 0–0.4)
EPITHELIAL CELLS, UA: ABNORMAL /HPF
ERYTHROCYTE [DISTWIDTH] IN BLOOD BY AUTOMATED COUNT: 13.6 % (ref 11.5–14.5)
ERYTHROCYTE [DISTWIDTH] IN BLOOD BY AUTOMATED COUNT: 42.1 FL (ref 35–45)
GFR SERPL CREATININE-BSD FRML MDRD: > 90 ML/MIN/1.73M2
GLUCOSE BLD-MCNC: 95 MG/DL (ref 70–108)
GLUCOSE URINE: NEGATIVE MG/DL
HCT VFR BLD CALC: 23.1 % (ref 37–47)
HEMOGLOBIN: 7.7 GM/DL (ref 12–16)
HEMOGLOBIN: 7.9 GM/DL (ref 12–16)
HEMOGLOBIN: 8.1 GM/DL (ref 12–16)
HEMOGLOBIN: 8.1 GM/DL (ref 12–16)
IMMATURE GRANS (ABS): 0.04 THOU/MM3 (ref 0–0.07)
IMMATURE GRANULOCYTES: 0.4 %
KETONES, URINE: NEGATIVE
LEUKOCYTE ESTERASE, URINE: ABNORMAL
LYMPHOCYTES # BLD: 19.9 %
LYMPHOCYTES ABSOLUTE: 2 THOU/MM3 (ref 1–4.8)
MCH RBC QN AUTO: 29.5 PG (ref 26–33)
MCHC RBC AUTO-ENTMCNC: 33.3 GM/DL (ref 32.2–35.5)
MCV RBC AUTO: 88.5 FL (ref 81–99)
MISCELLANEOUS 2: ABNORMAL
MONOCYTES # BLD: 5.7 %
MONOCYTES ABSOLUTE: 0.6 THOU/MM3 (ref 0.4–1.3)
NITRITE, URINE: NEGATIVE
NUCLEATED RED BLOOD CELLS: 0 /100 WBC
PH UA: 6.5 (ref 5–9)
PLATELET # BLD: 147 THOU/MM3 (ref 130–400)
PMV BLD AUTO: 10.5 FL (ref 9.4–12.4)
POTASSIUM SERPL-SCNC: 3.8 MEQ/L (ref 3.5–5.2)
PROTEIN UA: NEGATIVE
RBC # BLD: 2.61 MILL/MM3 (ref 4.2–5.4)
RBC URINE: ABNORMAL /HPF
RENAL EPITHELIAL, UA: ABNORMAL
SEG NEUTROPHILS: 72.6 %
SEGMENTED NEUTROPHILS ABSOLUTE COUNT: 7.3 THOU/MM3 (ref 1.8–7.7)
SODIUM BLD-SCNC: 143 MEQ/L (ref 135–145)
SPECIFIC GRAVITY, URINE: 1.01 (ref 1–1.03)
UROBILINOGEN, URINE: 1 EU/DL (ref 0–1)
WBC # BLD: 10.1 THOU/MM3 (ref 4.8–10.8)
WBC UA: ABNORMAL /HPF
YEAST: ABNORMAL

## 2020-01-25 PROCEDURE — 2580000003 HC RX 258: Performed by: PHYSICIAN ASSISTANT

## 2020-01-25 PROCEDURE — 6370000000 HC RX 637 (ALT 250 FOR IP): Performed by: INTERNAL MEDICINE

## 2020-01-25 PROCEDURE — 87040 BLOOD CULTURE FOR BACTERIA: CPT

## 2020-01-25 PROCEDURE — 6360000002 HC RX W HCPCS: Performed by: INTERNAL MEDICINE

## 2020-01-25 PROCEDURE — 6370000000 HC RX 637 (ALT 250 FOR IP): Performed by: FAMILY MEDICINE

## 2020-01-25 PROCEDURE — 81001 URINALYSIS AUTO W/SCOPE: CPT

## 2020-01-25 PROCEDURE — 36415 COLL VENOUS BLD VENIPUNCTURE: CPT

## 2020-01-25 PROCEDURE — 1200000003 HC TELEMETRY R&B

## 2020-01-25 PROCEDURE — 87086 URINE CULTURE/COLONY COUNT: CPT

## 2020-01-25 PROCEDURE — 99232 SBSQ HOSP IP/OBS MODERATE 35: CPT | Performed by: INTERNAL MEDICINE

## 2020-01-25 PROCEDURE — 94760 N-INVAS EAR/PLS OXIMETRY 1: CPT

## 2020-01-25 PROCEDURE — 85025 COMPLETE CBC W/AUTO DIFF WBC: CPT

## 2020-01-25 PROCEDURE — 80048 BASIC METABOLIC PNL TOTAL CA: CPT

## 2020-01-25 PROCEDURE — 85018 HEMOGLOBIN: CPT

## 2020-01-25 PROCEDURE — 93970 EXTREMITY STUDY: CPT

## 2020-01-25 PROCEDURE — 93005 ELECTROCARDIOGRAM TRACING: CPT | Performed by: PHYSICIAN ASSISTANT

## 2020-01-25 RX ORDER — SODIUM CHLORIDE AND POTASSIUM CHLORIDE .9; .15 G/100ML; G/100ML
SOLUTION INTRAVENOUS CONTINUOUS
Status: DISPENSED | OUTPATIENT
Start: 2020-01-25 | End: 2020-01-26

## 2020-01-25 RX ORDER — 0.9 % SODIUM CHLORIDE 0.9 %
1000 INTRAVENOUS SOLUTION INTRAVENOUS ONCE
Status: COMPLETED | OUTPATIENT
Start: 2020-01-25 | End: 2020-01-25

## 2020-01-25 RX ORDER — METOPROLOL TARTRATE 5 MG/5ML
5 INJECTION INTRAVENOUS ONCE
Status: DISCONTINUED | OUTPATIENT
Start: 2020-01-25 | End: 2020-01-25

## 2020-01-25 RX ADMIN — PANTOPRAZOLE SODIUM 40 MG: 40 TABLET, DELAYED RELEASE ORAL at 16:44

## 2020-01-25 RX ADMIN — Medication 1000 MCG: at 08:57

## 2020-01-25 RX ADMIN — SODIUM CHLORIDE 1000 ML: 9 INJECTION, SOLUTION INTRAVENOUS at 00:43

## 2020-01-25 RX ADMIN — ACETAMINOPHEN 650 MG: 325 TABLET ORAL at 14:29

## 2020-01-25 RX ADMIN — SODIUM CHLORIDE AND POTASSIUM CHLORIDE: .9; .15 SOLUTION INTRAVENOUS at 11:14

## 2020-01-25 RX ADMIN — PANTOPRAZOLE SODIUM 40 MG: 40 TABLET, DELAYED RELEASE ORAL at 06:46

## 2020-01-25 RX ADMIN — SODIUM CHLORIDE AND POTASSIUM CHLORIDE: .9; .15 SOLUTION INTRAVENOUS at 20:55

## 2020-01-25 RX ADMIN — ACETAMINOPHEN 650 MG: 325 TABLET ORAL at 03:35

## 2020-01-25 ASSESSMENT — PAIN SCALES - GENERAL
PAINLEVEL_OUTOF10: 0
PAINLEVEL_OUTOF10: 4
PAINLEVEL_OUTOF10: 2
PAINLEVEL_OUTOF10: 0
PAINLEVEL_OUTOF10: 0

## 2020-01-25 NOTE — PROGRESS NOTES
Hospitalist Progress Note    Patient:  Elias Alvarado    YOB: 1970    MRN: [id number]       Acct: [de-identified]     PCP: Joe Brothers MD    Date of Admission: 1/23/2020    Assessment/Plan:    Melena with acute upper GI bleed suspected: Denies taking regular NSAIDs, history of gastric bypass, cannot rule out ulcer  -GI on board, patient n.p.o., anticipating EGD later this afternoon   1/25-EGD showed distal esophagitis and gastritis but no ulceration, small bowel proximal 20 to 25 cm also evaluated no bleeding source, change Protonix drip to p.o., GI planning for possible colonoscopy or small bowel  capsule endoscopy  -CT of the abdomen pelvis shows moderate amount of stool      Acute blood loss anemia: Patient did not have any recent lab work done in the last few years, not sure about baseline hemoglobin, states it is always in normal range, on admission Hemoglobin 10.2, dropped to 7.8, blood pressure on the low side, GI giving 1 unit of PRBC , there could be some mild dilutional component too  1/25-hemoglobin around 7.7, received 1 unit yesterday,     Sinus tachycardia most likely from volume loss, continue IV fluids for 1 more day    Hypotension secondary to blood loss: 1 unit PRBC, IV fluids    History of gastric bypass surgery, 0224  Nonalcoholic fatty liver   Degenerative cervical disc disease -apparently has 3 ruptured disks   Follows with the pain management, taking NSAIDs occasionally      Expected discharge date: 1 to 2 days    Disposition:    [x] Home       [] TCU       [] Rehab       [] Psych       [] SNF       [] [facility]       [] Other-    Chief Complaint: Melena, lightheadedness       Patient presented to the emergency room with chief complaint of black stool and lightheadedness with tachycardia at home.       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 She kind of knew that it was abnormal and possibly bleeding but did not come right away and in the morning when she stood up she felt lightheaded and she checked her vitals and became tachycardic and came to the emergency room. Denies taking regular NSAIDs but admits to taking ibuprofen once in a while for headache and was prescribed naproxen 3 months ago by her pain management specialist but does not take it regularly in the last time she took was more than a week ago. Denies any alcohol use or smoking. Hospital Course: Patient had occult blood positive and hemoglobin was 10.2 and was admitted to the hospital and started on IV fluids and Protonix drip and gastroenterologist was consulted. Serial hemoglobin monitoring showed hemoglobin dropping to 7.8 and blood pressure trending down little and has been lightheaded on standing and mildly tachycardic intermediately. Gastroenterologist on board, anticipating EGD later this afternoon and since patient is symptomatic, 1 unit PRBCs being transfused currently. 1/25-, EGD showed distal esophagitis and gastritis but no ulceration proximal small bowel no bleeding source noted, patient had black stool again last night, hemoglobin 7.7 this morning, diet advanced by GI, considering colonoscopy and possible small bowel capsule endoscopy eventually, inpatient versus outpatient, CT of the abdomen done, no obvious abnormalities, there was concern for possible DVT suspected on the CT scan but venous Doppler did not show any DVTs. Patient had a low-grade fever could be from bleeding itself, blood cultures done, sinus tachycardia, continue IV fluids    Subjective (past 24 hours):  One episode of black stool last night, this morning was able to get up and go to the bathroom and shower and did have some tachycardia but no dizziness or lightheadedness, tolerating food, no swelling in the legs    Medications:  Reviewed    Infusion Medications    0.9% NaCl with KCl 20 mEq 100 mL/hr at 01/25/20 1114     Scheduled Medications    sodium chloride flush  10 mL Intravenous 2 times per day    pantoprazole  40 mg Oral BID AC    vitamin B-12  1,000 mcg Oral Daily    sodium chloride flush  10 mL Intravenous 2 times per day     PRN Meds: sodium chloride flush, sodium chloride flush, magnesium hydroxide, ondansetron, acetaminophen      Intake/Output Summary (Last 24 hours) at 1/25/2020 1411  Last data filed at 1/25/2020 1153  Gross per 24 hour   Intake 4129.63 ml   Output 2400 ml   Net 1729.63 ml       Diet:  DIET LOW FAT; Exam:  /66   Pulse 120   Temp 98.4 °F (36.9 °C) (Oral)   Resp 18   Ht 5' 3\" (1.6 m)   Wt 167 lb 3.2 oz (75.8 kg)   SpO2 100%   BMI 29.62 kg/m²     Physical Examination:   General appearance - alert, awake  and in no distress  HEENT: Normocephalic, Atraumatic, pupils reactive, mucous membranes moist  Chest - moving equally to respiration,symmetric air entry, clear to auscultation  Heart -tachycardiac regular rhythm, normal S1, S2, no murmurs,   Abdomen - soft, nontender, distended, no masses or organomegaly, BS+  Neurological - alert, oriented, normal speech, sensations intact and able to move all extremities   Extremities - peripheral pulses normal, no pedal edema,  Capillary refill less than 3 sec  Skin - normal coloration and turgor, no rashes      Labs:   Recent Labs     01/23/20  1756 01/23/20  2107 01/24/20  0652  01/25/20  0049 01/25/20  0624 01/25/20  1059   WBC 8.9  --  6.1  --   --  10.1  --    HGB 10.2* 8.9* 7.8*   < > 8.1* 7.7* 8.1*   HCT 30.9* 27.5* 24.4*  --   --  23.1*  --      --  154  --   --  147  --     < > = values in this interval not displayed.      Recent Labs     01/23/20  1756 01/24/20  0652 01/25/20  0624    142 Encourage ambulation           [] Already on Anticoagulation     Code Status: Full Code    Tele:   [x] yes             [] no    Active Hospital Problems    Diagnosis Date Noted    GIB (gastrointestinal bleeding) [K92.2] 01/23/2020       Electronically signed by Caitlin Michelle MD on 1/25/2020 at 2:11 PM

## 2020-01-25 NOTE — FLOWSHEET NOTE
Pt is a 54year old female who was sitting up eating her lunch. Many family members were in the room with her. We had prayer. 01/25/20 1130   Encounter Summary   Services provided to: Patient and family together   Referral/Consult From: [facility] Family members   Place of [address] [place] [address] Visiting Yes  (1/25)   Complexity of Encounter Moderate   Length of Encounter 15 minutes   Spiritual Assessment Completed Yes   Routine   Type Initial   Assessment Approachable   Intervention Nurtured hope;Prayer   Outcome Coping   Care Plan:  Continue spiritual and emotional care for patient and family. Including prayers.

## 2020-01-25 NOTE — PROGRESS NOTES
BEART      Physical Exam:    Vitals: /66   Pulse 118   Temp 98.6 °F (37 °C) (Oral)   Resp 16   Ht 5' 3\" (1.6 m)   Wt 167 lb 3.2 oz (75.8 kg)   SpO2 98%   BMI 29.62 kg/m²   24 hour intake/output:    Intake/Output Summary (Last 24 hours) at 1/25/2020 1646  Last data filed at 1/25/2020 1624  Gross per 24 hour   Intake 4440.13 ml   Output 2150 ml   Net 2290.13 ml     Last 3 weights: Wt Readings from Last 3 Encounters:   01/25/20 167 lb 3.2 oz (75.8 kg)   04/30/19 162 lb 6.4 oz (73.7 kg)   04/03/19 165 lb (74.8 kg)         General appearance - alert, well appearing, and in no distress  Neurological - alert, oriented, normal speech, no focal findings or movement disorder noted  Assessment:  Patient Active Problem List   Diagnosis    Acute appendicitis    Cervical radiculitis    Cervical spinal stenosis    GIB (gastrointestinal bleeding)     A  1. Melena  2. Fever    Plan:  1. Melena;  EGD did not show source of bleeding.  (discussed with patient and family). Hgb showed a small increase;  Recheck in am.   If melena persists or hgb falls again,  will consider bleeding scan  (small bowel capsule study cannot be done as in patient due to insurance reasons). 2.  Fever:  Blood cultures pending. CT scan did not show any etiology for fever.     [facility]

## 2020-01-25 NOTE — PLAN OF CARE
Problem: Discharge Planning:  Goal: Discharged to appropriate level of care  Description  Discharged to appropriate level of care  1/25/2020 0221 by Char Orozco RN  Outcome: Ongoing  Note:   Plans discharge to home with fiance. Continue to assess discharge needs as they arise. Problem: Bowel Function - Altered:  Goal: Bowel elimination is within specified parameters  Description  Bowel elimination is within specified parameters  1/25/2020 0221 by Char Orozco RN  Outcome: Ongoing  Note:   Monitoring bowel movements for blood, they are black at this time. Problem: Fluid Volume - Imbalance:  Goal: Will show no signs and symptoms of excessive bleeding  Description  Will show no signs and symptoms of excessive bleeding  1/25/2020 0221 by Char Orozco RN  Outcome: Ongoing  Note:   Hemoglobin lab draws every 6 hr.      Problem: Falls - Risk of:  Goal: Will remain free from falls  Description  Will remain free from falls  Outcome: Ongoing  Note:   Discussed use of alarms and hourly rounding with patient. Belongings within reach including call light. Up with gait belt, non skid footwear  Care plan reviewed with patient. Patient verbalize understanding of the plan of care and contribute to goal setting.

## 2020-01-26 LAB
ANION GAP SERPL CALCULATED.3IONS-SCNC: 10 MEQ/L (ref 8–16)
BASOPHILS # BLD: 0.5 %
BASOPHILS ABSOLUTE: 0 THOU/MM3 (ref 0–0.1)
BUN BLDV-MCNC: 8 MG/DL (ref 7–22)
CALCIUM SERPL-MCNC: 8.3 MG/DL (ref 8.5–10.5)
CHLORIDE BLD-SCNC: 110 MEQ/L (ref 98–111)
CO2: 22 MEQ/L (ref 23–33)
CREAT SERPL-MCNC: 0.5 MG/DL (ref 0.4–1.2)
EOSINOPHIL # BLD: 5.2 %
EOSINOPHILS ABSOLUTE: 0.4 THOU/MM3 (ref 0–0.4)
ERYTHROCYTE [DISTWIDTH] IN BLOOD BY AUTOMATED COUNT: 13.9 % (ref 11.5–14.5)
ERYTHROCYTE [DISTWIDTH] IN BLOOD BY AUTOMATED COUNT: 44.4 FL (ref 35–45)
GFR SERPL CREATININE-BSD FRML MDRD: > 90 ML/MIN/1.73M2
GLUCOSE BLD-MCNC: 93 MG/DL (ref 70–108)
HCT VFR BLD CALC: 23.3 % (ref 37–47)
HEMOGLOBIN: 7.4 GM/DL (ref 12–16)
HEMOGLOBIN: 7.6 GM/DL (ref 12–16)
HEMOGLOBIN: 8 GM/DL (ref 12–16)
HEMOGLOBIN: 8.6 GM/DL (ref 12–16)
IMMATURE GRANS (ABS): 0.02 THOU/MM3 (ref 0–0.07)
IMMATURE GRANULOCYTES: 0.3 %
LYMPHOCYTES # BLD: 31 %
LYMPHOCYTES ABSOLUTE: 2.3 THOU/MM3 (ref 1–4.8)
MCH RBC QN AUTO: 29 PG (ref 26–33)
MCHC RBC AUTO-ENTMCNC: 31.8 GM/DL (ref 32.2–35.5)
MCV RBC AUTO: 91.4 FL (ref 81–99)
MONOCYTES # BLD: 5.3 %
MONOCYTES ABSOLUTE: 0.4 THOU/MM3 (ref 0.4–1.3)
NUCLEATED RED BLOOD CELLS: 0 /100 WBC
ORGANISM: ABNORMAL
PLATELET # BLD: 154 THOU/MM3 (ref 130–400)
PMV BLD AUTO: 10.3 FL (ref 9.4–12.4)
POTASSIUM SERPL-SCNC: 3.9 MEQ/L (ref 3.5–5.2)
RBC # BLD: 2.55 MILL/MM3 (ref 4.2–5.4)
SEG NEUTROPHILS: 57.7 %
SEGMENTED NEUTROPHILS ABSOLUTE COUNT: 4.2 THOU/MM3 (ref 1.8–7.7)
SODIUM BLD-SCNC: 142 MEQ/L (ref 135–145)
TSH SERPL DL<=0.05 MIU/L-ACNC: 1.94 UIU/ML (ref 0.4–4.2)
URINE CULTURE REFLEX: ABNORMAL
WBC # BLD: 7.3 THOU/MM3 (ref 4.8–10.8)

## 2020-01-26 PROCEDURE — 6370000000 HC RX 637 (ALT 250 FOR IP): Performed by: INTERNAL MEDICINE

## 2020-01-26 PROCEDURE — 84443 ASSAY THYROID STIM HORMONE: CPT

## 2020-01-26 PROCEDURE — 80048 BASIC METABOLIC PNL TOTAL CA: CPT

## 2020-01-26 PROCEDURE — 6370000000 HC RX 637 (ALT 250 FOR IP): Performed by: FAMILY MEDICINE

## 2020-01-26 PROCEDURE — 99232 SBSQ HOSP IP/OBS MODERATE 35: CPT | Performed by: INTERNAL MEDICINE

## 2020-01-26 PROCEDURE — 1200000003 HC TELEMETRY R&B

## 2020-01-26 PROCEDURE — 2580000003 HC RX 258: Performed by: INTERNAL MEDICINE

## 2020-01-26 PROCEDURE — 85018 HEMOGLOBIN: CPT

## 2020-01-26 PROCEDURE — 85025 COMPLETE CBC W/AUTO DIFF WBC: CPT

## 2020-01-26 PROCEDURE — 36415 COLL VENOUS BLD VENIPUNCTURE: CPT

## 2020-01-26 RX ADMIN — PANTOPRAZOLE SODIUM 40 MG: 40 TABLET, DELAYED RELEASE ORAL at 16:09

## 2020-01-26 RX ADMIN — Medication 10 ML: at 20:15

## 2020-01-26 RX ADMIN — ACETAMINOPHEN 650 MG: 325 TABLET ORAL at 09:36

## 2020-01-26 RX ADMIN — ACETAMINOPHEN 650 MG: 325 TABLET ORAL at 23:35

## 2020-01-26 RX ADMIN — PANTOPRAZOLE SODIUM 40 MG: 40 TABLET, DELAYED RELEASE ORAL at 07:06

## 2020-01-26 RX ADMIN — Medication 1000 MCG: at 09:34

## 2020-01-26 ASSESSMENT — PAIN DESCRIPTION - PAIN TYPE: TYPE: ACUTE PAIN

## 2020-01-26 ASSESSMENT — PAIN - FUNCTIONAL ASSESSMENT: PAIN_FUNCTIONAL_ASSESSMENT: ACTIVITIES ARE NOT PREVENTED

## 2020-01-26 ASSESSMENT — PAIN SCALES - GENERAL
PAINLEVEL_OUTOF10: 3
PAINLEVEL_OUTOF10: 5
PAINLEVEL_OUTOF10: 0
PAINLEVEL_OUTOF10: 0
PAINLEVEL_OUTOF10: 5

## 2020-01-26 ASSESSMENT — PAIN DESCRIPTION - DESCRIPTORS: DESCRIPTORS: PRESSURE

## 2020-01-26 ASSESSMENT — PAIN DESCRIPTION - ONSET: ONSET: GRADUAL

## 2020-01-26 ASSESSMENT — PAIN DESCRIPTION - ORIENTATION: ORIENTATION: POSTERIOR

## 2020-01-26 ASSESSMENT — PAIN DESCRIPTION - FREQUENCY: FREQUENCY: CONTINUOUS

## 2020-01-26 ASSESSMENT — PAIN DESCRIPTION - PROGRESSION: CLINICAL_PROGRESSION: GRADUALLY WORSENING

## 2020-01-26 ASSESSMENT — PAIN DESCRIPTION - LOCATION: LOCATION: HEAD

## 2020-01-26 NOTE — PROGRESS NOTES
Hospitalist Progress Note    Patient:  Elias Alvarado    YOB: 1970    MRN: [id number]       Acct: [de-identified]     PCP: Joe Brothers MD    Date of Admission: 1/23/2020    Assessment/Plan:    Melena with acute upper GI bleed suspected: Denies taking regular NSAIDs, history of gastric bypass, cannot rule out ulcer  -GI on board, patient n.p.o., anticipating EGD later this afternoon   1/25-EGD showed distal esophagitis and gastritis but no ulceration, small bowel proximal 20 to 25 cm also evaluated no bleeding source, change Protonix drip to p.o., GI planning for possible colonoscopy or small bowel  capsule endoscopy  -CT of the abdomen pelvis shows moderate amount of stool  1/26-had another episode of black stool yesterday night and this morning, most likely old blood but mild drop in hemoglobin, will monitor, GI planning for colonoscopy as outpatient    Acute blood loss anemia: Patient did not have any recent lab work done in the last few years, not sure about baseline hemoglobin, states it is always in normal range, on admission Hemoglobin 10.2, dropped to 7.8, blood pressure on the low side, GI giving 1 unit of PRBC , there could be some mild dilutional component too  1/25-hemoglobin around 7.7, received 1 unit yesterday,   1/26-hemoglobin 7.4, stop IV fluids, recheck, still having some black stools possibly old blood,    Sinus tachycardia most likely from volume loss, continue IV fluids for 1 more day  1/26, mild tachycardia no dizziness or lightheadedness, monitor, encourage oral intake stop fluids    Hypotension secondary to blood loss: 1 unit PRBC, IV fluids    History of gastric bypass surgery, 1648  Nonalcoholic fatty liver   Degenerative cervical disc disease -apparently has 3 ruptured disks   Follows with the pain management, taking NSAIDs occasionally      Expected discharge date:  Tomorrow    Disposition:    [x] Home       [] TCU       [] Rehab       [] evidence of bowel obstruction. Probable inflow artifact of unopacified blood in the bilateral common femoral, external iliac, and common iliac veins however if there is concern for DVT, recommend bilateral lower extremity duplex venous ultrasound. **This report has been created using voice recognition software. It may contain minor errors which are inherent in voice recognition technology. **      Final report electronically signed by Dr. Lisa Gomez on 1/25/2020 1:03 AM          DVT prophylaxis: [] Lovenox                                 [x] SCDs                                 [] SQ Heparin                                 [] Encourage ambulation           [] Already on Anticoagulation     Code Status: Full Code    Tele:   [x] yes             [] no    Active Hospital Problems    Diagnosis Date Noted    GIB (gastrointestinal bleeding) [K92.2] 01/23/2020       Electronically signed by Sandra Obregon MD on 1/26/2020 at 11:04 AM

## 2020-01-26 NOTE — PLAN OF CARE
Problem: Discharge Planning:  Goal: Discharged to appropriate level of care  Description  Discharged to appropriate level of care  Outcome: Ongoing  Note:   Plans discharge to home with fiance. Continue to assess discharge needs as they arise. Problem: Bowel Function - Altered:  Goal: Bowel elimination is within specified parameters  Description  Bowel elimination is within specified parameters  Outcome: Ongoing  Note:   Monitoring bowel movements for blood, they are black at this time. Problem: Fluid Volume - Imbalance:  Goal: Will show no signs and symptoms of excessive bleeding  Description  Will show no signs and symptoms of excessive bleeding  Outcome: Ongoing  Note:   Hemoglobin lab draws every 6 hr.      Problem: Falls - Risk of:  Goal: Will remain free from falls  Description  Will remain free from falls  Outcome: Ongoing  Note:   Discussed use of alarms and hourly rounding with patient. Belongings within reach including call light. Up with gait belt, non skid footwear    Care plan reviewed with patient. Patient verbalize understanding of the plan of care and contribute to goal setting.

## 2020-01-27 ENCOUNTER — APPOINTMENT (OUTPATIENT)
Dept: NUCLEAR MEDICINE | Age: 50
DRG: 378 | End: 2020-01-27
Payer: COMMERCIAL

## 2020-01-27 VITALS
HEIGHT: 63 IN | SYSTOLIC BLOOD PRESSURE: 104 MMHG | RESPIRATION RATE: 16 BRPM | BODY MASS INDEX: 30.23 KG/M2 | OXYGEN SATURATION: 98 % | DIASTOLIC BLOOD PRESSURE: 73 MMHG | HEART RATE: 107 BPM | TEMPERATURE: 97.8 F | WEIGHT: 170.6 LBS

## 2020-01-27 LAB
ABSOLUTE RETIC #: 124 THOU/MM3 (ref 20–115)
BASOPHILS # BLD: 0.5 %
BASOPHILS ABSOLUTE: 0 THOU/MM3 (ref 0–0.1)
EOSINOPHIL # BLD: 8 %
EOSINOPHILS ABSOLUTE: 0.6 THOU/MM3 (ref 0–0.4)
ERYTHROCYTE [DISTWIDTH] IN BLOOD BY AUTOMATED COUNT: 14.4 % (ref 11.5–14.5)
ERYTHROCYTE [DISTWIDTH] IN BLOOD BY AUTOMATED COUNT: 44.5 FL (ref 35–45)
FERRITIN: 21 NG/ML (ref 10–291)
HCT VFR BLD CALC: 24.4 % (ref 37–47)
HEMOGLOBIN: 7.9 GM/DL (ref 12–16)
HEMOGLOBIN: 8.2 GM/DL (ref 12–16)
HEMOGLOBIN: 8.7 GM/DL (ref 12–16)
IMMATURE GRANS (ABS): 0.02 THOU/MM3 (ref 0–0.07)
IMMATURE GRANULOCYTES: 0.3 %
IMMATURE RETIC FRACT: 26.8 % (ref 3–15.9)
IRON SATURATION: 8 % (ref 20–50)
IRON: 22 UG/DL (ref 50–170)
LYMPHOCYTES # BLD: 32.4 %
LYMPHOCYTES ABSOLUTE: 2.4 THOU/MM3 (ref 1–4.8)
MCH RBC QN AUTO: 29 PG (ref 26–33)
MCHC RBC AUTO-ENTMCNC: 32.4 GM/DL (ref 32.2–35.5)
MCV RBC AUTO: 89.7 FL (ref 81–99)
MONOCYTES # BLD: 6 %
MONOCYTES ABSOLUTE: 0.5 THOU/MM3 (ref 0.4–1.3)
NUCLEATED RED BLOOD CELLS: 0 /100 WBC
PLATELET # BLD: 181 THOU/MM3 (ref 130–400)
PMV BLD AUTO: 10.5 FL (ref 9.4–12.4)
RBC # BLD: 2.72 MILL/MM3 (ref 4.2–5.4)
RETIC HEMOGLOBIN: 32.1 PG (ref 28.2–35.7)
RETICULOCYTE ABSOLUTE COUNT: 4.6 % (ref 0.5–2)
SEG NEUTROPHILS: 52.8 %
SEGMENTED NEUTROPHILS ABSOLUTE COUNT: 4 THOU/MM3 (ref 1.8–7.7)
TOTAL IRON BINDING CAPACITY: 262 UG/DL (ref 171–450)
WBC # BLD: 7.5 THOU/MM3 (ref 4.8–10.8)

## 2020-01-27 PROCEDURE — 6370000000 HC RX 637 (ALT 250 FOR IP): Performed by: INTERNAL MEDICINE

## 2020-01-27 PROCEDURE — 82728 ASSAY OF FERRITIN: CPT

## 2020-01-27 PROCEDURE — 6370000000 HC RX 637 (ALT 250 FOR IP): Performed by: FAMILY MEDICINE

## 2020-01-27 PROCEDURE — 36415 COLL VENOUS BLD VENIPUNCTURE: CPT

## 2020-01-27 PROCEDURE — 6370000000 HC RX 637 (ALT 250 FOR IP): Performed by: NURSE PRACTITIONER

## 2020-01-27 PROCEDURE — 85046 RETICYTE/HGB CONCENTRATE: CPT

## 2020-01-27 PROCEDURE — 85018 HEMOGLOBIN: CPT

## 2020-01-27 PROCEDURE — 2580000003 HC RX 258: Performed by: INTERNAL MEDICINE

## 2020-01-27 PROCEDURE — 85025 COMPLETE CBC W/AUTO DIFF WBC: CPT

## 2020-01-27 PROCEDURE — 3430000000 HC RX DIAGNOSTIC RADIOPHARMACEUTICAL: Performed by: NURSE PRACTITIONER

## 2020-01-27 PROCEDURE — 2709999900 HC NON-CHARGEABLE SUPPLY

## 2020-01-27 PROCEDURE — 78278 ACUTE GI BLOOD LOSS IMAGING: CPT

## 2020-01-27 PROCEDURE — 83550 IRON BINDING TEST: CPT

## 2020-01-27 PROCEDURE — 99239 HOSP IP/OBS DSCHRG MGMT >30: CPT | Performed by: INTERNAL MEDICINE

## 2020-01-27 PROCEDURE — A9560 TC99M LABELED RBC: HCPCS | Performed by: NURSE PRACTITIONER

## 2020-01-27 PROCEDURE — 83540 ASSAY OF IRON: CPT

## 2020-01-27 RX ORDER — PANTOPRAZOLE SODIUM 40 MG/1
40 TABLET, DELAYED RELEASE ORAL
Qty: 30 TABLET | Refills: 1 | Status: SHIPPED | OUTPATIENT
Start: 2020-01-27

## 2020-01-27 RX ORDER — DOCUSATE SODIUM 100 MG/1
100 CAPSULE, LIQUID FILLED ORAL 2 TIMES DAILY
Status: DISCONTINUED | OUTPATIENT
Start: 2020-01-27 | End: 2020-01-27 | Stop reason: HOSPADM

## 2020-01-27 RX ORDER — FERROUS SULFATE 325(65) MG
325 TABLET ORAL
Status: DISCONTINUED | OUTPATIENT
Start: 2020-01-27 | End: 2020-01-27 | Stop reason: HOSPADM

## 2020-01-27 RX ORDER — FERROUS SULFATE 325(65) MG
325 TABLET ORAL
Qty: 90 TABLET | Refills: 3 | Status: SHIPPED | OUTPATIENT
Start: 2020-01-27

## 2020-01-27 RX ORDER — ASCORBIC ACID 500 MG
250 TABLET ORAL
Status: DISCONTINUED | OUTPATIENT
Start: 2020-01-27 | End: 2020-01-27 | Stop reason: HOSPADM

## 2020-01-27 RX ORDER — ASCORBIC ACID 250 MG
250 TABLET ORAL
Qty: 90 TABLET | Refills: 3 | Status: SHIPPED | OUTPATIENT
Start: 2020-01-27

## 2020-01-27 RX ADMIN — PANTOPRAZOLE SODIUM 40 MG: 40 TABLET, DELAYED RELEASE ORAL at 16:58

## 2020-01-27 RX ADMIN — OXYCODONE HYDROCHLORIDE AND ACETAMINOPHEN 250 MG: 500 TABLET ORAL at 16:58

## 2020-01-27 RX ADMIN — OXYCODONE HYDROCHLORIDE AND ACETAMINOPHEN 250 MG: 500 TABLET ORAL at 12:44

## 2020-01-27 RX ADMIN — DOCUSATE SODIUM 100 MG: 100 CAPSULE, LIQUID FILLED ORAL at 12:45

## 2020-01-27 RX ADMIN — FERROUS SULFATE TAB 325 MG (65 MG ELEMENTAL FE) 325 MG: 325 (65 FE) TAB at 12:44

## 2020-01-27 RX ADMIN — Medication 10 ML: at 08:19

## 2020-01-27 RX ADMIN — ACETAMINOPHEN 650 MG: 325 TABLET ORAL at 13:19

## 2020-01-27 RX ADMIN — Medication 1000 MCG: at 08:19

## 2020-01-27 RX ADMIN — Medication 24.8 MILLICURIE: at 10:30

## 2020-01-27 RX ADMIN — ACETAMINOPHEN 650 MG: 325 TABLET ORAL at 09:16

## 2020-01-27 RX ADMIN — PANTOPRAZOLE SODIUM 40 MG: 40 TABLET, DELAYED RELEASE ORAL at 05:10

## 2020-01-27 RX ADMIN — FERROUS SULFATE TAB 325 MG (65 MG ELEMENTAL FE) 325 MG: 325 (65 FE) TAB at 16:58

## 2020-01-27 ASSESSMENT — PAIN SCALES - GENERAL
PAINLEVEL_OUTOF10: 4
PAINLEVEL_OUTOF10: 0
PAINLEVEL_OUTOF10: 6

## 2020-01-27 NOTE — PROGRESS NOTES
CLINICAL PHARMACY: DISCHARGE MED RECONCILIATION/REVIEW    [facility] ([facility] Select Patient?: No  Total # of Interventions Recommended: 0  Total # Interventions Accepted: 0  Intervention Severity:   - Level 1 Intervention Present?: No   - Level 2 #: 0   - Level 3 #: 0   Time Spent (min): Devonte Dumont PharmD, BCPS  1/27/2020  1:58 PM

## 2020-01-27 NOTE — PROGRESS NOTES
Gastroenterology Progress Note:     Patient Name:  Mahendra Rivera   MRN: [id number]  [id number]  YOB: 1970  Admit Date: 1/23/2020  5:43 PM  Primary Care Physician: Jeovanny Perez MD   [id number]     Patient seen and examined. 24 hours events and chart reviewed. Subjective: Hgb 8.7 Patient sitting up in the chair, family at side. She continues to have black stools, however she is receiving PO iron TID. NM GI blood loss scan negative. Objective:  /71   Pulse 127   Temp 98.1 °F (36.7 °C) (Oral)   Resp 16   Ht 5' 3\" (1.6 m)   Wt 170 lb 9.6 oz (77.4 kg)   SpO2 100%   BMI 30.22 kg/m²     Physical Exam:    General:  Nourished in no distress  HEENT: Atraumatic, normocephalic. Moist oral mucous membranes. Neck: Supple without adenopathy, JVD, thyromegaly or masses. Trachea midline. CV: Heart RRR, no murmurs, rubs, gallops. Resp: Even, easy without cough or accessory use. Lungs clear to ascultation bilaterally. Abd: Round, soft, nontender. No hepatosplenomegaly or mass present. Active bowel sounds heard. No distention noted. Ext:  Without cyanosis, clubbing, edema. Skin: Pink, warm, dry  Neuro:  Alert, oriented x 3 with no obvious deficits.        Rectal: deferred    Labs:   CBC:   Lab Results   Component Value Date    WBC 7.5 01/27/2020    HGB 8.7 01/27/2020    HCT 24.4 01/27/2020    MCV 89.7 01/27/2020     01/27/2020     BMP:   Lab Results   Component Value Date     01/26/2020    K 3.9 01/26/2020    K 4.0 01/24/2020     01/26/2020    CO2 22 01/26/2020    BUN 8 01/26/2020    CREATININE 0.5 01/26/2020    CALCIUM 8.3 01/26/2020     PT/INR:   Lab Results   Component Value Date    INR 1.09 01/23/2020     Lipids:   Lab Results   Component Value Date    ALKPHOS 57 01/24/2020    ALT 8 01/24/2020    AST 12 01/24/2020    BILITOT 0.3 01/24/2020    BILIDIR <0.2 01/23/2020    LABALBU 3.2 01/24/2020    LIPASE 32.0 12/12/2012     Significant Diagnostic Studies:   NM GI blood

## 2020-01-27 NOTE — DISCHARGE SUMMARY
Hospital Medicine Discharge Summary      Patient Identification:   Hasmukh Watkins   : 1970  MRN: [id number]   Account: [de-identified]      Patient's PCP: Tyler Franco MD    Admit Date: 2020     Discharge Date: 2020     Admitting Physician: Guille Ramirez MD     Discharge Physician: Julissa Tang MD     Discharge Diagnoses:    Melena with upper acute GI bleed   Esophagitis   Gastritis   Acute blood loss anemia   Hypotension secondary to blood loss  Sinus tachycardia   Nonalcoholic fatty liver  Degenerative cervical disc disease -apparently has 3 ruptured disks   History of gastric bypass surgery,     The patient was seen and examined on day of discharge and this discharge summary is in conjunction with any daily progress note from day of discharge. Hospital Course:   Hasmukh Watkins is a 54 y.o. female admitted to [facility] on 2020 for Melena, lightheadedness         Patient presented to the emergency room with chief complaint of black stool and lightheadedness with tachycardia at home.       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 She kind of knew that it was abnormal and possibly bleeding but did not come right away and in the morning when she stood up she felt lightheaded and she checked her vitals and became tachycardic and came to the emergency room. Denies taking regular NSAIDs but admits to taking ibuprofen once in a while for headache and was prescribed naproxen 3 months ago by her pain management specialist but does not take it regularly in the last time she took was more than a week ago. Denies any alcohol use or smoking.     Patient's stool was positive for occult blood and hemoglobin was 10.2 on admission. Patient did not have regular lab work done as outpatient and did not have a baseline hemoglobin available. Patient's blood pressure was little low in the upper 80s and needed aggressive IV fluids and hydration. Patient's hemoglobin did trend down to 7.8 and 1 unit PRBC was given as recommended by GI.  patient had low-grade fever of 100.4 most likely secondary to the bleeding. Blood cultures were negative. EGD showed distal esophagitis and gastritis but no ulceration and proximal small bowel also did not show any evidence of bleeding. Patient continued to have black stools and a CT of the abdomen was done and did not show any significant abnormality except a lot of stool in the colon. There was some concern about veins being not visible well on the CT and a venous Doppler was done and there was no DVT. Patient continued to have black stools but hemoglobin was stable and actually improving once IV fluids were discontinued and is currently at 8.7. Patient is mildly tachycardic without any orthostatics and is able to tolerate activity. Reassured the patient that it would take time and keep herself hydrated well and increase the iron tablets that she is taking to 3 times daily and if she does not improve will need IV Ve iron infusions. Iron profile was done and showed ferritin 21 iron level 22 iron saturation 8% and total iron binding capacity of 262. Did discuss with the patient that she probably has some iron deficiency on the top of the acute bleeding. Recheck hemoglobin in a week and follow with GI and colonoscopy and possible small bowel capsule endoscopy as outpatient. Patient was very apprehensive about going home and the bleeding scan was done and there was no acute bleeding noted and she felt reassured about going home.     Exam:     Vitals:  Vitals:    01/27/20 0505 01/27/20 0815 01/27/20 1241 01/27/20 1545   BP: 100/60 (!) 104/57

## 2020-01-31 LAB
BLOOD CULTURE, ROUTINE: NORMAL
BLOOD CULTURE, ROUTINE: NORMAL

## 2020-04-30 ENCOUNTER — TELEPHONE (OUTPATIENT)
Dept: PHYSICAL MEDICINE AND REHAB | Age: 50
End: 2020-04-30

## 2020-06-03 ENCOUNTER — HOSPITAL ENCOUNTER (OUTPATIENT)
Dept: WOMENS IMAGING | Age: 50
Discharge: HOME | End: 2020-06-03
Payer: COMMERCIAL

## 2020-06-03 PROCEDURE — 77063 BREAST TOMOSYNTHESIS BI: CPT

## 2020-06-30 ENCOUNTER — NURSE ONLY (OUTPATIENT)
Dept: LAB | Age: 50
End: 2020-06-30

## 2020-07-09 ENCOUNTER — OFFICE VISIT (OUTPATIENT)
Dept: PHYSICAL MEDICINE AND REHAB | Age: 50
End: 2020-07-09
Payer: COMMERCIAL

## 2020-07-09 VITALS
HEIGHT: 63 IN | DIASTOLIC BLOOD PRESSURE: 72 MMHG | BODY MASS INDEX: 30.12 KG/M2 | WEIGHT: 170 LBS | SYSTOLIC BLOOD PRESSURE: 128 MMHG | TEMPERATURE: 98.1 F

## 2020-07-09 PROCEDURE — 99214 OFFICE O/P EST MOD 30 MIN: CPT | Performed by: NURSE PRACTITIONER

## 2020-07-09 RX ORDER — CYCLOBENZAPRINE HCL 5 MG
5 TABLET ORAL 3 TIMES DAILY PRN
Qty: 90 TABLET | Refills: 0 | Status: SHIPPED | OUTPATIENT
Start: 2020-07-09

## 2020-07-09 NOTE — PROGRESS NOTES
[facility]  [address]  Dept: [phone]  Dept Fax: [phone]: [phone]    Visit Date: 7/9/2020    Functionality Assessment/Goals Worksheet     On a scale of 0 (Does not Interfere) to 10 (Completely Interferes)     1. Which number describes how during the past week pain has interfered with the following:  A. General Activity:  8  B. Mood: 8  C. Walking Ability:  0  D. Normal Work (Includes both work outside the home and housework):  8  E. Relations with Other People:   5  F. Sleep:   10  G. Enjoyment of Life:   8    2. Patient Prefers to Take their Pain Medications:     []  On a regular basis   [x]  Only when necessary    []  Does not take pain medications    3. What are the Patient's Goals/Expectations for Visiting Pain Management? []  Learn about my pain    [x]  Receive Medication   []  Physical Therapy     []  Treat Depression   []  Receive Injections    [x]  Treat Sleep   []  Deal with Anxiety and Stress   []  Treat Opoid Dependence/Addiction   [x]  Other: treat pain without meds that upset my stomach   I need to be able to sleep       HPI:   Amy L Caroll Fleischer is a 54 y.o. female is here today for    Chief Complaint: Neck pain    HPI     Patient here today for follow up. BO C6 completed 4/30/19. No follow up completed. Patient states she received great relief for about a month. Patient able to sleep and move better. Patient states she then moved out of town and has recently moved back     MRI reviewed. Mild canal stenosis noted below. Patient states that she does not have the normal contour in her neck. Discussed options for treatment, limited with imaging. Patient states that she occasionally has pain shoot up into her head. Has had flexeril in the past. Patient states she took this at night as it made her groggy. Discussed about re-starting flexeril at night. Patient to monitor how she feels with this. Will call our office for update. If ok, then start gabapentin 100mg at night for one week then BID. Patient understanding of side effects and may take some titration for effectiveness. Medications reviewed. Examination:MRI SPINE CERVICAL WITHOUT CONTRAST  Date of Exam: 02/19/2019    Relevant Clinical Information:Neck pain with inflammation and warmness. Tingling in hands. No injury    NUMBER OF VIEWS:1    EXAM: MRI CERVICAL SPINE WITHOUT CONTRAST    CLINICAL INDICATION: Neck   pain with inflammation    COMPARISON: None    PROCEDURE: The following   pulse sequences were obtained:  1. Sagittal T1, T2 and STIR. 2. Axial   T1, T2 and STIR. FINDINGS:    The cervical spine demonstrates normal   lordotic curvature and alignment. Vertebral body heights are maintained. The marrow demonstrates normal homogeneous T2 signal. The cerebellar   tonsils terminate at or above the level of the foramen magnum. The   cervical cord demonstrates normal-caliber and signal intensity. At   level C2/C3: This disc interspace is unremarkable. At level C3/C4:   Disc desiccation is noted at this level without appreciable disc height   loss. Posterior disc extrusion indents the ventral thecal sac narrowing   the midline AP thecal sac diameter to 9 mm consistent with mild central   canal compromise. The neural foramina are patent. At level C4/C5:   Disc desiccation and mild disc height also noted at this level in   association with posterior diffuse disc bulge which contours the ventral   thecal sac narrowing the thecal sac diameter to 10 mm consistent with   borderline central canal compromise. The neural foramina are patent.     At   level C5/C6: Moderate disc height loss is noted at this level in association   with posterior disc extrusion which indents the ventral thecal sac   narrowing the midline AP thecal sac diameter to 9 mm consistent with   mild central canal spasms, Disp: 90 tablet, Rfl: 0    pantoprazole (PROTONIX) 40 MG tablet, Take 1 tablet by mouth every morning (before breakfast), Disp: 30 tablet, Rfl: 1    ferrous sulfate 325 (65 Fe) MG tablet, Take 1 tablet by mouth 3 times daily (with meals), Disp: 90 tablet, Rfl: 3    vitamin C (VITAMIN C) 250 MG tablet, Take 1 tablet by mouth 3 times daily (with meals) Take with iron tablets, Disp: 90 tablet, Rfl: 3    cyclobenzaprine (FLEXERIL) 5 MG tablet, Take 5 mg by mouth 3 times daily as needed for Muscle spasms (only taking nightly), Disp: , Rfl:     Prenatal Multivit-Min-Fe-FA (PRE-SUNDAY FORMULA PO), Take by mouth 2 times daily, Disp: , Rfl:     Calcium Carbonate-Vitamin D (CALCIUM + D PO), Take by mouth nightly , Disp: , Rfl:     Cyanocobalamin (VITAMIN B-12 CR) 1000 MCG TBCR, Take by mouth daily , Disp: , Rfl:     Subjective:      Review of Systems   Constitutional: Positive for activity change. Musculoskeletal: Positive for arthralgias, myalgias and neck pain. Objective:     Vitals:    20 1240   BP: 128/72   Temp: 98.1 °F (36.7 °C)   Weight: 170 lb (77.1 kg)   Height: 5' 3\" (1.6 m)       Physical Exam  Vitals signs reviewed. Constitutional:       General: She is not in acute distress. Appearance: She is well-developed. She is not diaphoretic. HENT:      Head: Normocephalic and atraumatic. Not macrocephalic and not microcephalic. Right Ear: External ear normal.      Left Ear: External ear normal.   Eyes:      General:         Right eye: No discharge. Left eye: No discharge. Conjunctiva/sclera: Conjunctivae normal.   Neck:      Musculoskeletal: Decreased range of motion. Muscular tenderness present. Trachea: No tracheal deviation. Comments: Occasional right hand tingling and dropping things  Pulmonary:      Effort: Pulmonary effort is normal. No respiratory distress. Musculoskeletal:         General: Tenderness present.    Skin:     General: Skin is warm and dry.      Coloration: Skin is not pale. Findings: No rash. Neurological:      Mental Status: She is alert and oriented to person, place, and time. Cranial Nerves: No cranial nerve deficit. Psychiatric:         Attention and Perception: She is attentive. Speech: Speech normal.         Behavior: Behavior normal.         Thought Content: Thought content normal.         Judgment: Judgment normal.            Assessment:     1. Cervical stenosis of spine    2. Cervicalgia    3. Chronic pain syndrome            Plan:      OARRS reviewed. Current MED: 0  Patient was not offered naloxone for home. Discussed long term side effects of medications, tolerance, dependency and addiction. UDS preformed today for compliance. Patient told can not receive any pain medications from any other source. No evidence of abuse, diversion or aberrant behavior. Medications and/or procedures to improve function and quality of life- patient understanding with this and that may not be pain free  Discussed with patient about safe storage of medications at home  Discussed possible weaning of medication dosing dependent on treatment/procedure results. Testing: none  Procedures: BO C6 #1  Discussed with patient about risks with procedure including infection, reaction to medication, increased pain, or bleeding. Medications: flexeril 5mg at night. Patient to monitor how she feels with this. Will call our office for update. If ok, then start gabapentin 100mg at night for one week then BID  Patient to monitor cause and effect with right hand numbness      Meds. Prescribed:   Orders Placed This Encounter   Medications    cyclobenzaprine (FLEXERIL) 5 MG tablet     Sig: Take 1 tablet by mouth 3 times daily as needed for Muscle spasms     Dispense:  90 tablet     Refill:  0       Return for BO C6 #1, follow up after procedure.            Electronically signed by PAOLO Mota CNP on7/9/2020 at 1:13 PM

## 2020-07-09 NOTE — PATIENT INSTRUCTIONS
 Testing: none   Procedures: BO C6 #1   Discussed with patient about risks with procedure including infection, reaction to medication, increased pain, or bleeding.  Medications: flexeril 5mg at night. Patient to monitor how she feels with this. Will call our office for update.  If ok, then start gabapentin 100mg at night for one week then BID

## 2020-07-22 ENCOUNTER — HOSPITAL ENCOUNTER (OUTPATIENT)
Age: 50
Discharge: HOME | End: 2020-07-22
Payer: COMMERCIAL

## 2020-07-22 PROCEDURE — U0003 INFECTIOUS AGENT DETECTION BY NUCLEIC ACID (DNA OR RNA); SEVERE ACUTE RESPIRATORY SYNDROME CORONAVIRUS 2 (SARS-COV-2) (CORONAVIRUS DISEASE [COVID-19]), AMPLIFIED PROBE TECHNIQUE, MAKING USE OF HIGH THROUGHPUT TECHNOLOGIES AS DESCRIBED BY CMS-2020-01-R: HCPCS

## 2020-07-24 LAB — SARS-COV-2: NOT DETECTED

## 2020-07-27 ENCOUNTER — TELEPHONE (OUTPATIENT)
Dept: PHYSICAL MEDICINE AND REHAB | Age: 50
End: 2020-07-27

## 2020-07-27 NOTE — TELEPHONE ENCOUNTER
Pt LVM that Father had been taken to ICU @ [facility]. Will not able to have Procedure this morning. Procedure and follow up cancelled. Will call back to reschedule.

## 2021-06-07 ENCOUNTER — HOSPITAL ENCOUNTER (OUTPATIENT)
Dept: WOMENS IMAGING | Age: 51
Discharge: HOME | End: 2021-06-07
Payer: COMMERCIAL

## 2021-06-07 DIAGNOSIS — Z12.31 VISIT FOR SCREENING MAMMOGRAM: ICD-10-CM

## 2021-06-07 PROCEDURE — 77063 BREAST TOMOSYNTHESIS BI: CPT

## 2022-06-29 ENCOUNTER — HOSPITAL ENCOUNTER (OUTPATIENT)
Dept: WOMENS IMAGING | Age: 52
Discharge: HOME | End: 2022-06-29
Payer: COMMERCIAL

## 2022-06-29 DIAGNOSIS — Z12.31 SCREENING MAMMOGRAM FOR HIGH-RISK PATIENT: ICD-10-CM

## 2022-06-29 PROCEDURE — 77063 BREAST TOMOSYNTHESIS BI: CPT

## 2023-05-02 ENCOUNTER — TELEPHONE (OUTPATIENT)
Dept: CARDIOLOGY CLINIC | Age: 53
End: 2023-05-02

## 2023-05-02 NOTE — TELEPHONE ENCOUNTER
Patient is calling about a referral to cardiology. She said her provider sent the referral about an hour ago. She is requesting to see Dr. Chetan Emerson, but his first available is 06/06/2023. Works until [employer] but is able to take off time for appt, she said she will take first available whenever it is. She did say is is off this Friday as well.   She said she is being referred for tachycardia  Please advise  She said to schedule her for whatever time is available

## 2023-05-03 ENCOUNTER — OFFICE VISIT (OUTPATIENT)
Dept: CARDIOLOGY CLINIC | Age: 53
End: 2023-05-03
Payer: COMMERCIAL

## 2023-05-03 VITALS
BODY MASS INDEX: 28.53 KG/M2 | DIASTOLIC BLOOD PRESSURE: 92 MMHG | WEIGHT: 161 LBS | HEIGHT: 63 IN | HEART RATE: 88 BPM | SYSTOLIC BLOOD PRESSURE: 122 MMHG

## 2023-05-03 DIAGNOSIS — R00.0 TACHYCARDIA: Primary | ICD-10-CM

## 2023-05-03 PROCEDURE — 93000 ELECTROCARDIOGRAM COMPLETE: CPT | Performed by: NUCLEAR MEDICINE

## 2023-05-03 PROCEDURE — 99204 OFFICE O/P NEW MOD 45 MIN: CPT | Performed by: NUCLEAR MEDICINE

## 2023-05-03 RX ORDER — SEMAGLUTIDE 1.34 MG/ML
INJECTION, SOLUTION SUBCUTANEOUS
COMMUNITY

## 2023-05-03 ASSESSMENT — ENCOUNTER SYMPTOMS
ABDOMINAL DISTENTION: 0
RECTAL PAIN: 0
ANAL BLEEDING: 0
ABDOMINAL PAIN: 0
SHORTNESS OF BREATH: 0
CHEST TIGHTNESS: 0
VOMITING: 0
BACK PAIN: 0
NAUSEA: 0
DIARRHEA: 0
PHOTOPHOBIA: 0
CONSTIPATION: 0
BLOOD IN STOOL: 0

## 2023-05-03 NOTE — PROGRESS NOTES
[facility] [place] [address]  [place] [address]  Dept: [phone]  Dept Fax: [phone]  Loc: [phone]    Visit Date: 5/3/2023    Oonfre Spring is a 50 y.o. female who presents todayfor:  Chief Complaint   Patient presents with    New Patient    Palpitations     Here for the first time  Lately had some palpitation   Tachycardia 150s   Was at work   Felt dizzy   Light headed  No syncope  Lasted severe minutes  Some dyspnea  Has had intermittent palpitation   Not very frequent   Runs higher HR  Does have history of gastric bypass  No known CAD before  No known arrhythmia  No known DM or hyperlipidemia  No smoking   Family history of CAD     HPI:  Palpitations   Pertinent negatives include no diaphoresis, nausea, shortness of breath or vomiting.    Past Medical History:   Diagnosis Date    Arthritis     Liver disease     PONV (postoperative nausea and vomiting)       Past Surgical History:   Procedure Laterality Date    APPENDECTOMY       SECTION      CHOLECYSTECTOMY      EPIDURAL STEROID INJECTION N/A 2019    EPIDURAL STEROID INJECTION BO @ C6 LEFT performed by Sheyla Toledo MD at [facility]      right lower leg cadaver bone    HYSTERECTOMY (CERVIX STATUS UNKNOWN)      SHELLY-EN-Y GASTRIC BYPASS      UPPER GASTROINTESTINAL ENDOSCOPY N/A 2020    EGD BIOPSY performed by Tim Mcleod MD at [address]       Family History   Problem Relation Age of Onset    Cancer Maternal Grandmother 61        lung - smoker    Diabetes Father     Heart Disease Father         CABG    High Blood Pressure Father     Prostate Cancer Maternal Grandfather     Stroke Neg Hx      Social History     Tobacco Use    Smoking status: Never    Smokeless tobacco: Never   Substance Use Topics    Alcohol use:

## 2023-05-17 ENCOUNTER — HOSPITAL ENCOUNTER (OUTPATIENT)
Dept: NON INVASIVE DIAGNOSTICS | Age: 53
Discharge: HOME | End: 2023-05-17
Payer: COMMERCIAL

## 2023-05-17 DIAGNOSIS — R00.0 TACHYCARDIA: ICD-10-CM

## 2023-05-17 LAB
LV EF: 55 %
LVEF MODALITY: NORMAL

## 2023-05-17 PROCEDURE — 93306 TTE W/DOPPLER COMPLETE: CPT

## 2023-05-17 PROCEDURE — 93270 REMOTE 30 DAY ECG REV/REPORT: CPT

## 2023-06-30 ENCOUNTER — HOSPITAL ENCOUNTER (OUTPATIENT)
Dept: WOMENS IMAGING | Age: 53
Discharge: HOME | End: 2023-06-30
Payer: COMMERCIAL

## 2023-06-30 DIAGNOSIS — Z12.31 VISIT FOR SCREENING MAMMOGRAM: ICD-10-CM

## 2023-06-30 PROCEDURE — 77063 BREAST TOMOSYNTHESIS BI: CPT

## 2023-11-02 ENCOUNTER — OFFICE VISIT (OUTPATIENT)
Dept: CARDIOLOGY CLINIC | Age: 53
End: 2023-11-02
Payer: COMMERCIAL

## 2023-11-02 VITALS
SYSTOLIC BLOOD PRESSURE: 122 MMHG | DIASTOLIC BLOOD PRESSURE: 76 MMHG | BODY MASS INDEX: 26.58 KG/M2 | HEART RATE: 92 BPM | HEIGHT: 63 IN | WEIGHT: 150 LBS

## 2023-11-02 DIAGNOSIS — R00.2 PALPITATION: Primary | ICD-10-CM

## 2023-11-02 DIAGNOSIS — I47.10 SVT (SUPRAVENTRICULAR TACHYCARDIA): ICD-10-CM

## 2023-11-02 PROCEDURE — 99213 OFFICE O/P EST LOW 20 MIN: CPT | Performed by: NUCLEAR MEDICINE

## 2023-11-02 RX ORDER — METOPROLOL SUCCINATE 25 MG/1
25 TABLET, EXTENDED RELEASE ORAL DAILY
Qty: 30 TABLET | Refills: 5 | Status: SHIPPED | OUTPATIENT
Start: 2023-11-02

## 2023-11-02 RX ORDER — METOPROLOL SUCCINATE 25 MG/1
25 TABLET, EXTENDED RELEASE ORAL DAILY
COMMUNITY
End: 2023-11-02 | Stop reason: SDUPTHER

## 2023-11-02 NOTE — PROGRESS NOTES
[address] [address]  Dept: [phone]  Dept Fax: [phone]  Loc: [phone]    Visit Date: 11/2/2023    Marguerite Mitchell is a 48 y.o. female who presents todayfor:  Chief Complaint   Patient presents with    Follow-up    Palpitations     Continues to have tachycardia  Intermittent in nature  Every few days  Did have a short SVT on the monitor   No syncope  Possible POTs as well  No known CAD  No angina  Normal echo     HPI:  HPI  Past Medical History:   Diagnosis Date    Arthritis     Liver disease     PONV (postoperative nausea and vomiting)       Past Surgical History:   Procedure Laterality Date    [place] STEROID INJECTION N/A 4/30/2019    EPIDURAL STEROID INJECTION BO @ C6 LEFT performed by Camilo Monique MD at [facility]      right lower leg cadaver bone    HYSTERECTOMY (CERVIX STATUS UNKNOWN)  2004    SHELLY-EN-Y GASTRIC BYPASS  2012    UPPER GASTROINTESTINAL ENDOSCOPY N/A 1/24/2020    EGD BIOPSY performed by Barbie Lemos MD at [address] [place] [address] LEFT  2018     Family History   Problem Relation Age of Onset    Cancer Maternal Grandmother 61        lung - smoker    Diabetes Father     Heart Disease Father         CABG    High Blood Pressure Father     Prostate Cancer Maternal Grandfather     Stroke Neg Hx      Social History     Tobacco Use    Smoking status: Never    Smokeless tobacco: Never   Substance Use Topics    Alcohol use: Yes     Comment: rare      Current Outpatient Medications   Medication Sig Dispense Refill    Semaglutide-Weight Management (WEGOVY SC) Inject into the skin      cyclobenzaprine (FLEXERIL) 5 MG tablet Take 1 tablet by mouth 3 times daily as needed for Muscle spasms (Patient taking differently: Take

## 2024-03-22 ENCOUNTER — APPOINTMENT (OUTPATIENT)
Dept: GENERAL RADIOLOGY | Age: 54
End: 2024-03-22
Payer: COMMERCIAL

## 2024-03-22 ENCOUNTER — HOSPITAL ENCOUNTER (EMERGENCY)
Age: 54
Discharge: HOME | End: 2024-03-22
Attending: EMERGENCY MEDICINE
Payer: COMMERCIAL

## 2024-03-22 VITALS
OXYGEN SATURATION: 100 % | HEIGHT: 63 IN | HEART RATE: 89 BPM | TEMPERATURE: 98.4 F | WEIGHT: 129 LBS | SYSTOLIC BLOOD PRESSURE: 114 MMHG | RESPIRATION RATE: 18 BRPM | DIASTOLIC BLOOD PRESSURE: 82 MMHG | BODY MASS INDEX: 22.86 KG/M2

## 2024-03-22 DIAGNOSIS — R00.2 PALPITATIONS: Primary | ICD-10-CM

## 2024-03-22 LAB
ALBUMIN SERPL BCG-MCNC: 4.5 G/DL (ref 3.5–5.1)
ALP SERPL-CCNC: 88 U/L (ref 38–126)
ALT SERPL W/O P-5'-P-CCNC: 30 U/L (ref 11–66)
ANION GAP SERPL CALC-SCNC: 9 MEQ/L (ref 8–16)
AST SERPL-CCNC: 31 U/L (ref 5–40)
BASOPHILS ABSOLUTE: 0 THOU/MM3 (ref 0–0.1)
BASOPHILS NFR BLD AUTO: 0.7 %
BILIRUB SERPL-MCNC: 0.8 MG/DL (ref 0.3–1.2)
BUN SERPL-MCNC: 14 MG/DL (ref 7–22)
CALCIUM SERPL-MCNC: 9.2 MG/DL (ref 8.5–10.5)
CHLORIDE SERPL-SCNC: 103 MEQ/L (ref 98–111)
CO2 SERPL-SCNC: 27 MEQ/L (ref 23–33)
CREAT SERPL-MCNC: 0.8 MG/DL (ref 0.4–1.2)
DEPRECATED RDW RBC AUTO: 41.9 FL (ref 35–45)
EKG ATRIAL RATE: 93 BPM
EKG P AXIS: 53 DEGREES
EKG P-R INTERVAL: 168 MS
EKG Q-T INTERVAL: 344 MS
EKG QRS DURATION: 74 MS
EKG QTC CALCULATION (BAZETT): 427 MS
EKG R AXIS: 28 DEGREES
EKG T AXIS: 31 DEGREES
EKG VENTRICULAR RATE: 93 BPM
EOSINOPHIL NFR BLD AUTO: 3.7 %
EOSINOPHILS ABSOLUTE: 0.2 THOU/MM3 (ref 0–0.4)
ERYTHROCYTE [DISTWIDTH] IN BLOOD BY AUTOMATED COUNT: 14 % (ref 11.5–14.5)
GFR SERPL CREATININE-BSD FRML MDRD: > 60 ML/MIN/1.73M2
GLUCOSE SERPL-MCNC: 89 MG/DL (ref 70–108)
HCT VFR BLD AUTO: 41.3 % (ref 37–47)
HGB BLD-MCNC: 13.6 GM/DL (ref 12–16)
IMM GRANULOCYTES # BLD AUTO: 0 THOU/MM3 (ref 0–0.07)
IMM GRANULOCYTES NFR BLD AUTO: 0 %
LYMPHOCYTES ABSOLUTE: 1.9 THOU/MM3 (ref 1–4.8)
LYMPHOCYTES NFR BLD AUTO: 34.4 %
MAGNESIUM SERPL-MCNC: 2.1 MG/DL (ref 1.6–2.4)
MCH RBC QN AUTO: 27.1 PG (ref 26–33)
MCHC RBC AUTO-ENTMCNC: 32.9 GM/DL (ref 32.2–35.5)
MCV RBC AUTO: 82.4 FL (ref 81–99)
MONOCYTES ABSOLUTE: 0.3 THOU/MM3 (ref 0.4–1.3)
MONOCYTES NFR BLD AUTO: 5.9 %
NEUTROPHILS NFR BLD AUTO: 55.3 %
NRBC BLD AUTO-RTO: 0 /100 WBC
OSMOLALITY SERPL CALC.SUM OF ELEC: 277.5 MOSMOL/KG (ref 275–300)
PLATELET # BLD AUTO: 252 THOU/MM3 (ref 130–400)
PMV BLD AUTO: 10.4 FL (ref 9.4–12.4)
POTASSIUM SERPL-SCNC: 3.7 MEQ/L (ref 3.5–5.2)
PROT SERPL-MCNC: 7.2 G/DL (ref 6.1–8)
RBC # BLD AUTO: 5.01 MILL/MM3 (ref 4.2–5.4)
SEGMENTED NEUTROPHILS ABSOLUTE COUNT: 3 THOU/MM3 (ref 1.8–7.7)
SODIUM SERPL-SCNC: 139 MEQ/L (ref 135–145)
T4 FREE SERPL-MCNC: 1.32 NG/DL (ref 0.93–1.68)
TROPONIN, HIGH SENSITIVITY: < 6 NG/L (ref 0–12)
TSH SERPL DL<=0.005 MIU/L-ACNC: 1.07 UIU/ML (ref 0.4–4.2)
WBC # BLD AUTO: 5.4 THOU/MM3 (ref 4.8–10.8)

## 2024-03-22 PROCEDURE — 2580000003 HC RX 258

## 2024-03-22 PROCEDURE — 85025 COMPLETE CBC W/AUTO DIFF WBC: CPT

## 2024-03-22 PROCEDURE — 96360 HYDRATION IV INFUSION INIT: CPT

## 2024-03-22 PROCEDURE — 93010 ELECTROCARDIOGRAM REPORT: CPT | Performed by: INTERNAL MEDICINE

## 2024-03-22 PROCEDURE — 93005 ELECTROCARDIOGRAM TRACING: CPT

## 2024-03-22 PROCEDURE — 80053 COMPREHEN METABOLIC PANEL: CPT

## 2024-03-22 PROCEDURE — 84484 ASSAY OF TROPONIN QUANT: CPT

## 2024-03-22 PROCEDURE — 71045 X-RAY EXAM CHEST 1 VIEW: CPT

## 2024-03-22 PROCEDURE — 84439 ASSAY OF FREE THYROXINE: CPT

## 2024-03-22 PROCEDURE — 99285 EMERGENCY DEPT VISIT HI MDM: CPT

## 2024-03-22 PROCEDURE — 84443 ASSAY THYROID STIM HORMONE: CPT

## 2024-03-22 PROCEDURE — 36415 COLL VENOUS BLD VENIPUNCTURE: CPT

## 2024-03-22 PROCEDURE — 83735 ASSAY OF MAGNESIUM: CPT

## 2024-03-22 RX ORDER — 0.9 % SODIUM CHLORIDE 0.9 %
1000 INTRAVENOUS SOLUTION INTRAVENOUS ONCE
Status: COMPLETED | OUTPATIENT
Start: 2024-03-22 | End: 2024-03-22

## 2024-03-22 RX ADMIN — SODIUM CHLORIDE 1000 ML: 9 INJECTION, SOLUTION INTRAVENOUS at 13:10

## 2024-03-22 NOTE — ED PROVIDER NOTES
[facility] EMERGENCY DEPT  EMERGENCY DEPARTMENT ENCOUNTER          Pt Name: Sloane Bustillo  MRN: [id number]  Birthdate 1970  Date of evaluation: 3/22/2024  Physician: Naman Diaz MD  Supervising Attending Physician: Mannie Bella MD       CHIEF COMPLAINT       Chief Complaint   Patient presents with    Tachycardia         HISTORY OF PRESENT ILLNESS    HPI  Sloane Bustillo is a 53 y.o. female with a history of SVT, on metoprolol 25 mg extended release daily who presents to the ED for evaluation of tachycardia palpitations.  Patient describes that she was previously evaluated for this problem by Dr. Minesh Hood of cardiology including normal transthoracic echo, month-long cardiac event monitor with only 1 episode of 45 beats of SVT.  Patient states that she was started on metoprolol by Dr. Hood this past December, but does not take it every day it drops her blood pressure low and causes her to have symptoms of fatigue.  Last time she took metoprolol was 2 days ago.  Today, twice while at work, the patient describes 10 to 15-minute episodes of heart racing palpitations.  Once earlier this morning, once around lunchtime.  No shortness of breath or chest pain at those times.  No headache, vision changes. No symptoms currently.    TTEcho 23:   Summary   Ejection fraction is visually estimated at 55%.   Overall left ventricular function is normal.    Cardiac event monitor 23 - 6/15/23:  Sinus rhythm. One episode of 4 - 5 beats SVT.    REVIEW OF SYSTEMS   Review of Systems  As above in HPI    PAST MEDICAL AND SURGICAL HISTORY     Past Medical History:   Diagnosis Date    Arthritis     Liver disease     PONV (postoperative nausea and vomiting)      Past Surgical History:   Procedure Laterality Date    APPENDECTOMY       SECTION      CHOLECYSTECTOMY      EPIDURAL STEROID INJECTION N/A 2019    EPIDURAL STEROID INJECTION BO @ C6 LEFT performed by Bubba Devine,  tablet Take 1 tablet by mouth daily, Disp-30 tablet, R-5Normal      Semaglutide,0.25 or 0.5MG/DOS, (OZEMPIC, 0.25 OR 0.5 MG/DOSE,) 2 MG/1.5ML SOPN Inject into the skinHistorical Med      cyclobenzaprine (FLEXERIL) 5 MG tablet Take 1 tablet by mouth 3 times daily as needed for Muscle spasms, Disp-90 tablet, R-0Normal      pantoprazole (PROTONIX) 40 MG tablet Take 1 tablet by mouth every morning (before breakfast), Disp-30 tablet, R-1Normal      ferrous sulfate 325 (65 Fe) MG tablet Take 1 tablet by mouth 3 times daily (with meals), Disp-90 tablet, R-3Print      vitamin C (VITAMIN C) 250 MG tablet Take 1 tablet by mouth 3 times daily (with meals) Take with iron tablets, Disp-90 tablet, R-3Print      Prenatal Multivit-Min-Fe-FA (PRE- FORMULA PO) Take by mouth 2 times dailyHistorical Med      Calcium Carbonate-Vitamin D (CALCIUM + D PO) Take by mouth nightly Historical Med      Cyanocobalamin (VITAMIN B-12 CR) 1000 MCG TBCR Take by mouth daily Historical Med               SOCIAL HISTORY     Social History     Social History Narrative    Not on file     Social History     Tobacco Use    Smoking status: Never    Smokeless tobacco: Never   Vaping Use    Vaping Use: Never used   Substance Use Topics    Alcohol use: Yes     Comment: rare    Drug use: No         ALLERGIES     Allergies   Allergen Reactions    Antiseptic Products, Misc. Other (See Comments)     Blister- Antiseptic from IV start kit         FAMILY HISTORY     Family History   Problem Relation Age of Onset    Cancer Maternal Grandmother 60        lung - smoker    Diabetes Father     Heart Disease Father         CABG    High Blood Pressure Father     Prostate Cancer Maternal Grandfather     Stroke Neg Hx          PHYSICAL EXAM     Body mass index is 22.85 kg/m².     Additional Vital Signs:  Vitals:    24 1345   BP: 114/82   Pulse: 89   Resp: 18   Temp: 98.4   SpO2: 100%       Physical Exam  Vitals and nursing note reviewed.   Cardiovascular:      Rate

## 2024-03-22 NOTE — ED TRIAGE NOTES
Patient presents to the Emergency Department via [facility]. Patient presents with a complaint of tachycardia. Patient states that she felt her heart pounding for 15 minutes around 0700 then at work with a heart rate of 150 with noted diaphoresis. EKG obtained and IV inserted. Patient is alert and oriented x4, patient does not appear in acute distress upon triage. Patient respirations are regular and unlabored with even rise and fall of chest. Call light within reach.

## 2024-03-22 NOTE — ED PROVIDER NOTES
[facility]  EMERGENCY MEDICINE ATTENDING ATTESTATION      Evaluation of Sloane Bustillo.   Case discussed and care plan developed with resident physician.   I agree with the resident physician documentation and plan as documented by him, except if my documentation differs.   Patient seen, interviewed and examined by me  I reviewed the medical, surgical, family and social history, medications and allergies.   I have reviewed and interpreted all available lab, radiology and ekg results available at the moment.  I have reviewed the nursing documentation.       Brief H&P   Patient c/o rapid heart rate earlier today.  Patient had 1 month event monitor that showed 1 episode of SVT in the past because of similar symptoms to today.  Patient is currently asymptomatic.    Physical exam is notable for well appearing, nonfocal exam.      Medical Decision Making   MDM:   Tachycardia, possibly rebound symptoms from missed doses of beta-blockers.  Plan:   IV line, labs  IV fluids  EKG  Observation in the ED while awaiting results    Please see the resident physician completed note for final disposition except as documented on this attestation.   I have reviewed and interpreted all available lab, radiology and ekg results available at the moment.  Diagnosis, treatment and disposition plans were discussed and agreed upon by patient.   This transcription was electronically signed. It was dictated by use of voice recognition software and electronically transcribed. The transcription may contain errors not detected in proofreading.     I performed direct supervision and was present for the critical portion following procedures: None  Critical care time on this case: None    Electronically signed by Mannie Bella MD on 3/22/24 at 1:25 PM EDT        Mannie Bella MD  03/22/24 5025

## 2024-03-22 NOTE — DISCHARGE INSTRUCTIONS
Split 25 mg metoprolol tablets in half to take a dose of 12.5 mg daily.    Call Dr. Enrique's office to establish follow-up.

## 2024-03-28 LAB
EKG ATRIAL RATE: 93 BPM
EKG P AXIS: 53 DEGREES
EKG P-R INTERVAL: 168 MS
EKG Q-T INTERVAL: 344 MS
EKG QRS DURATION: 74 MS
EKG QTC CALCULATION (BAZETT): 427 MS
EKG R AXIS: 28 DEGREES
EKG T AXIS: 31 DEGREES
EKG VENTRICULAR RATE: 93 BPM

## 2024-04-02 RX ORDER — METOPROLOL SUCCINATE 25 MG/1
25 TABLET, EXTENDED RELEASE ORAL DAILY
Qty: 90 TABLET | Refills: 0 | Status: SHIPPED | OUTPATIENT
Start: 2024-04-02

## 2024-04-15 NOTE — PROGRESS NOTES
[facility] PROFESSIONAL SERVICES  [facility] [address]   [address]   [facility] [address]   Dept: [phone]   Dept Fax: [phone]   Loc: [phone]      Chief Complaint   Patient presents with    Follow-up     Cardiologist:  Dr. Hood  54 yo female presents for recent ER/6 month f/u. Hx of palpitations, SVT, ? POTS.     HR was 150s, BP was 80s/50s. No chest pain, breathing was okay. Some sweating, flushed appearing. No symtpoms if not having higher HR.     General:   No fever, no chills, no weight loss, no fatigue  Pulmonary:    No dyspnea, no wheezing  Cardiac:    Denies recent chest pain   GI:     No nausea or vomiting, no abdominal pain  Neuro:     No dizziness or light headedness  Musculoskeletal:  No recent active issues  Extremities:   No edema      Past Medical History:   Diagnosis Date    Arthritis     Liver disease     PONV (postoperative nausea and vomiting)        Allergies   Allergen Reactions    Antiseptic Products, Misc. Other (See Comments)     Blister- Antiseptic from IV start kit       Current Outpatient Medications   Medication Sig Dispense Refill    Multiple Vitamins-Minerals (MULTIPLE VITAMINS/WOMENS PO) Take by mouth      metoprolol succinate (TOPROL XL) 25 MG extended release tablet TAKE 1 TABLET BY MOUTH DAILY (Patient taking differently: Take 0.5 tablets by mouth daily) 90 tablet 0    Semaglutide-Weight Management (WEGOVY SC) Inject into the skin      cyclobenzaprine (FLEXERIL) 5 MG tablet Take 1 tablet by mouth 3 times daily as needed for Muscle spasms (Patient taking differently: Take 2 tablets by mouth nightly as needed for Muscle spasms) 90 tablet 0    ferrous sulfate 325 (65 Fe) MG tablet Take 1 tablet by mouth 3 times daily (with meals) 90 tablet 3    vitamin C (VITAMIN C) 250 MG tablet Take 1 tablet by mouth 3 times daily (with meals) Take with iron tablets 90 tablet 3    Calcium Carbonate-Vitamin D (CALCIUM + D PO) Take by mouth nightly

## 2024-04-17 ENCOUNTER — OFFICE VISIT (OUTPATIENT)
Dept: CARDIOLOGY CLINIC | Age: 54
End: 2024-04-17
Payer: COMMERCIAL

## 2024-04-17 VITALS
DIASTOLIC BLOOD PRESSURE: 75 MMHG | HEART RATE: 90 BPM | SYSTOLIC BLOOD PRESSURE: 106 MMHG | WEIGHT: 131.8 LBS | BODY MASS INDEX: 23.35 KG/M2 | HEIGHT: 63 IN

## 2024-04-17 DIAGNOSIS — I47.10 SVT (SUPRAVENTRICULAR TACHYCARDIA) (HCC): ICD-10-CM

## 2024-04-17 DIAGNOSIS — R00.2 PALPITATIONS: Primary | ICD-10-CM

## 2024-04-17 PROCEDURE — 99214 OFFICE O/P EST MOD 30 MIN: CPT | Performed by: STUDENT IN AN ORGANIZED HEALTH CARE EDUCATION/TRAINING PROGRAM

## 2024-04-17 NOTE — PROGRESS NOTES
C/o palpitations and high heart rate.    Denies cp, sob, dizziness and BERTIN.    EKG done 3-.

## 2024-07-02 ENCOUNTER — HOSPITAL ENCOUNTER (OUTPATIENT)
Dept: WOMENS IMAGING | Age: 54
Discharge: HOME | End: 2024-07-02
Payer: COMMERCIAL

## 2024-07-02 VITALS — WEIGHT: 121 LBS | HEIGHT: 63 IN | BODY MASS INDEX: 21.44 KG/M2

## 2024-07-02 DIAGNOSIS — Z12.31 VISIT FOR SCREENING MAMMOGRAM: ICD-10-CM

## 2024-07-02 PROCEDURE — 77063 BREAST TOMOSYNTHESIS BI: CPT

## 2024-11-04 NOTE — PROGRESS NOTES
[facility] PHYSICIANS [place] SPECIAL[facility]  [facility] CARDIOLOGY  [address]  [address]  [facility] [address]  Dept: [phone]  Dept Fax: [phone]  Loc: [phone]    Visit Date: 2024    Sloane Bustillo is a 54 y.o. female who presents todayfor:  Chief Complaint   Patient presents with    6 Month Follow-Up     Denies cardiac concerns     Cardiologist: Erwin Fonseca of palpitations, SVT, ? POTS.     HPI: 6 month f/u  No more palpitations. She can tell when episodes are coming on and can cut back to avoid them. Having issues with metoprolol, dropping her BP low. No dizzy or lighteaded feelings, just feels sluggish. Takes metoprolol in the morning. Has not tried taking in the evening.     Past Surgical History:   Procedure Laterality Date    APPENDECTOMY      BREAST SURGERY Bilateral 2022    lift and reduction     SECTION      CHOLECYSTECTOMY      EPIDURAL STEROID INJECTION N/A 2019    EPIDURAL STEROID INJECTION BO @ C6 LEFT performed by Bubba Devine MD at [facility]    FACIAL RECONSTRUCTION SURGERY      FRACTURE SURGERY      right lower leg cadaver bone    HYSTERECTOMY (CERVIX STATUS UNKNOWN)      SHELLY-EN-Y GASTRIC BYPASS  2012    UPPER GASTROINTESTINAL ENDOSCOPY N/A 2020    EGD BIOPSY performed by Jomar Vega MD at [facility]    US BREAST BIOPSY W LOC DEVICE 1ST LESION LEFT  2018     Family History   Problem Relation Age of Onset    Cancer Maternal Grandmother 60        lung - smoker    Diabetes Father     Heart Disease Father         CABG    High Blood Pressure Father     Prostate Cancer Maternal Grandfather     Stroke Neg Hx      Social History     Tobacco Use    Smoking status: Never    Smokeless tobacco: Never   Substance Use Topics    Alcohol use: Yes     Comment: rare      Current Outpatient Medications   Medication Sig Dispense Refill    Multiple Vitamins-Minerals (MULTIPLE VITAMINS/WOMENS PO) Take by mouth      metoprolol

## 2024-11-06 ENCOUNTER — OFFICE VISIT (OUTPATIENT)
Dept: CARDIOLOGY CLINIC | Age: 54
End: 2024-11-06
Payer: COMMERCIAL

## 2024-11-06 VITALS
DIASTOLIC BLOOD PRESSURE: 74 MMHG | SYSTOLIC BLOOD PRESSURE: 109 MMHG | HEART RATE: 82 BPM | WEIGHT: 121.6 LBS | HEIGHT: 63 IN | BODY MASS INDEX: 21.55 KG/M2

## 2024-11-06 DIAGNOSIS — I47.10 SVT (SUPRAVENTRICULAR TACHYCARDIA) (HCC): ICD-10-CM

## 2024-11-06 DIAGNOSIS — R00.2 PALPITATIONS: Primary | ICD-10-CM

## 2024-11-06 PROCEDURE — 99214 OFFICE O/P EST MOD 30 MIN: CPT | Performed by: STUDENT IN AN ORGANIZED HEALTH CARE EDUCATION/TRAINING PROGRAM

## 2025-02-12 ENCOUNTER — TRANSCRIBE ORDERS (OUTPATIENT)
Dept: ADMINISTRATIVE | Age: 55
End: 2025-02-12

## 2025-02-12 DIAGNOSIS — Z78.0 ASYMPTOMATIC MENOPAUSE: Primary | ICD-10-CM

## 2025-02-12 DIAGNOSIS — Z82.62 FAMILY HX OSTEOPOROSIS: ICD-10-CM

## 2025-07-07 ENCOUNTER — HOSPITAL ENCOUNTER (OUTPATIENT)
Dept: WOMENS IMAGING | Age: 55
Discharge: HOME | End: 2025-07-07
Attending: OBSTETRICS & GYNECOLOGY
Payer: COMMERCIAL

## 2025-07-07 VITALS — HEIGHT: 62 IN | BODY MASS INDEX: 23.22 KG/M2 | WEIGHT: 126.2 LBS

## 2025-07-07 DIAGNOSIS — Z12.31 VISIT FOR SCREENING MAMMOGRAM: ICD-10-CM

## 2025-07-07 DIAGNOSIS — Z78.0 ASYMPTOMATIC MENOPAUSE: ICD-10-CM

## 2025-07-07 DIAGNOSIS — Z82.62 FAMILY HX OSTEOPOROSIS: ICD-10-CM

## 2025-07-07 PROCEDURE — 77080 DXA BONE DENSITY AXIAL: CPT

## 2025-07-07 PROCEDURE — 77063 BREAST TOMOSYNTHESIS BI: CPT

## 2025-08-08 ENCOUNTER — OFFICE VISIT (OUTPATIENT)
Age: 55
End: 2025-08-08
Payer: COMMERCIAL

## 2025-08-08 VITALS
DIASTOLIC BLOOD PRESSURE: 70 MMHG | SYSTOLIC BLOOD PRESSURE: 118 MMHG | BODY MASS INDEX: 23.21 KG/M2 | HEIGHT: 62 IN | OXYGEN SATURATION: 99 % | WEIGHT: 126.1 LBS | HEART RATE: 102 BPM

## 2025-08-08 DIAGNOSIS — M81.0 AGE-RELATED OSTEOPOROSIS WITHOUT CURRENT PATHOLOGICAL FRACTURE: Primary | ICD-10-CM

## 2025-08-08 PROCEDURE — 99204 OFFICE O/P NEW MOD 45 MIN: CPT | Performed by: INTERNAL MEDICINE

## 2025-08-08 ASSESSMENT — RHEUMATOLOGY NEW PATIENT QUESTIONNAIRE
SEIZURES: N
FAINTING: N
MEMORY LOSS: N
JOINT SWELLING: N
SWOLLEN OR TENDER GLANDS: N
SKIN TIGHTNESS: N
LOSS OF CONSCIOUSNESS: N
DEPRESSION: N
UNUSUAL FATIGUE: N
AGITATION: N
NAUSEA: N
NODULES/BUMPS: N
VAGINAL DRYNESS: Y
MUSCLE WEAKNESS: N
FEVER: N
UNEXPLAINED WEIGHT CHANGE: N
DOUBLE OR BLURRED VISION: Y
BEHAVIORAL CHANGES: N
CHEST PAIN: N
ABNORMAL URINE: N
SKIN REDNESS: N
BLACK STOOLS: N
UNEXPLAINED HEARING LOSS: N
HEADACHES: N
EYE DRYNESS: N
DIFFICULTY SWALLOWING: N
SWOLLEN LEGS OR FEET: N
NUMBNESS OR TINGLING IN HANDS OR FEET: N
DIFFICULTY BREATHING LYING DOWN: N
EASY BRUISING: N
PAIN OR BURNING ON URINATION: N
HOARSE VOICE: N
PERSISTENT DIARRHEA: N
DIFFICULTY FALLING ASLEEP: Y
SHORTNESS OF BREATH: N
BLOOD IN STOOLS: N
UNUSUALLY RAPID OR SLOWED HEART RATE: N
EYE PAIN: N
EYE REDNESS: N
COLOR CHANGES OF HANDS OR FEET IN THE COLD: N
SUN SENSITIVE (SUN ALLERGY): N
SORES IN MOUTH OR NOSE: N
ANXIETY: Y
MORNING STIFFNESS: N
LOSS OF VISION: N
DIFFICULTY STAYING ASLEEP: Y
EXCESSIVE HAIR LOSS (MORE THAN YOUR NORM): Y
HEARTBURN OR REFLUX: N
RASH OR ULCERS: N
ANEMIA: Y
COUGH: N
JAUNDICE: N
NIGHT SWEATS: N
EASILY LOSING TEMPER: N
DRYNESS OF MOUTH: N
VOMITING OF BLOOD OR COFFEE GROUND CONSISTENCY MATERIAL: N
UNUSUAL BLEEDING: N
JOINT PAIN: Y
RASH: N
STOMACH PAIN: N
INCREASED SUSCEPTIBILITY TO INFECTION: N

## 2025-08-08 ASSESSMENT — ENCOUNTER SYMPTOMS
VOMITING: 0
SHORTNESS OF BREATH: 0
NAUSEA: 0
ABDOMINAL PAIN: 0
COUGH: 0

## 2025-08-12 DIAGNOSIS — M81.0 AGE-RELATED OSTEOPOROSIS WITHOUT CURRENT PATHOLOGICAL FRACTURE: Primary | ICD-10-CM

## 2025-08-12 LAB
ALBUMIN: 4.7 G/DL (ref 3.5–5.2)
ALK PHOSPHATASE: 106 U/L (ref 30–111)
ALT SERPL-CCNC: 37 U/L (ref 5–33)
ANION GAP SERPL CALCULATED.3IONS-SCNC: 14 MMOL/L (ref 7–16)
AST SERPL-CCNC: 32 U/L (ref 9–40)
BASOPHILS ABSOLUTE: 0.04 K/UL (ref 0–0.2)
BASOPHILS RELATIVE PERCENT: 0.6 % (ref 0–2)
BILIRUB SERPL-MCNC: 0.6 MG/DL
BUN BLDV-MCNC: 9 MG/DL (ref 6–20)
CALCIUM SERPL-MCNC: 9.5 MG/DL (ref 8.6–10.5)
CHLORIDE BLD-SCNC: 103 MMOL/L (ref 96–107)
CO2: 26 MMOL/L (ref 18–32)
CREAT SERPL-MCNC: 0.67 MG/DL (ref 0.51–1.15)
EGFR IF NONAFRICAN AMERICAN: 105 ML/MIN/1.73M2
EOSINOPHILS ABSOLUTE: 0.24 K/UL (ref 0–0.8)
EOSINOPHILS RELATIVE PERCENT: 3.4 % (ref 0–5)
GLUCOSE: 89 MG/DL (ref 70–100)
HCT VFR BLD CALC: 41.5 % (ref 35–47)
HEMOGLOBIN: 13.5 G/DL (ref 11.9–16)
IMMATURE GRANS (ABS): 0.03 K/UL (ref 0–0.06)
IMMATURE GRANULOCYTES %: 0.4 % (ref 0–2)
LYMPHOCYTES ABSOLUTE: 2.77 K/UL (ref 0.9–5.2)
LYMPHOCYTES RELATIVE PERCENT: 39.5 % (ref 20–45)
MAGNESIUM: 2.2 MG/DL (ref 1.6–2.6)
MCH RBC QN AUTO: 27.1 PG (ref 26–33)
MCHC RBC AUTO-ENTMCNC: 32.5 G/DL (ref 32–35)
MCV RBC AUTO: 83 FL (ref 75–100)
MONOCYTES ABSOLUTE: 0.5 K/UL (ref 0.1–1)
MONOCYTES RELATIVE PERCENT: 7.1 % (ref 0–13)
NEUTROPHILS ABSOLUTE: 3.44 K/UL (ref 1.9–8)
NEUTROPHILS RELATIVE PERCENT: 49 % (ref 45–75)
PDW BLD-RTO: 13 % (ref 11.2–14.8)
PHOSPHORUS: 4.6 MG/DL (ref 2.5–4.5)
PLATELET # BLD: 229 THOUS/CMM (ref 140–440)
POTASSIUM SERPL-SCNC: 4.5 MMOL/L (ref 3.5–5.4)
PTH INTACT: 66 PG/ML (ref 12–65)
RBC # BLD: 4.99 MILL/CMM (ref 3.8–5.2)
SED RATE, AUTOMATED: 8 MM/HR (ref 0–29)
SODIUM BLD-SCNC: 143 MMOL/L (ref 135–148)
TOTAL PROTEIN: 7.1 G/DL (ref 6–8.3)
TSH SERPL DL<=0.05 MIU/L-ACNC: 1.54 UIU/ML (ref 0.27–4.2)
VITAMIN D 25-HYDROXY: 33.6 NG/ML (ref 30–100)
WBC # BLD: 7 THDS/CMM (ref 3.6–11)

## 2025-08-12 RX ORDER — SODIUM CHLORIDE 0.9 % (FLUSH) 0.9 %
5-40 SYRINGE (ML) INJECTION PRN
OUTPATIENT
Start: 2025-08-12

## 2025-08-12 RX ORDER — HYDROCORTISONE SODIUM SUCCINATE 100 MG/2ML
100 INJECTION INTRAMUSCULAR; INTRAVENOUS
OUTPATIENT
Start: 2025-08-12

## 2025-08-12 RX ORDER — FAMOTIDINE 10 MG/ML
20 INJECTION, SOLUTION INTRAVENOUS
OUTPATIENT
Start: 2025-08-12

## 2025-08-12 RX ORDER — EPINEPHRINE 1 MG/ML
0.3 INJECTION, SOLUTION, CONCENTRATE INTRAVENOUS PRN
OUTPATIENT
Start: 2025-08-12

## 2025-08-12 RX ORDER — ZOLEDRONIC ACID 0.05 MG/ML
5 INJECTION, SOLUTION INTRAVENOUS ONCE
OUTPATIENT
Start: 2025-08-12 | End: 2025-08-12

## 2025-08-12 RX ORDER — SODIUM CHLORIDE 9 MG/ML
5-250 INJECTION, SOLUTION INTRAVENOUS PRN
OUTPATIENT
Start: 2025-08-12

## 2025-08-12 RX ORDER — SODIUM CHLORIDE 9 MG/ML
INJECTION, SOLUTION INTRAVENOUS PRN
OUTPATIENT
Start: 2025-08-12

## 2025-08-12 RX ORDER — ACETAMINOPHEN 325 MG/1
650 TABLET ORAL
OUTPATIENT
Start: 2025-08-12

## 2025-08-12 RX ORDER — HEPARIN SODIUM (PORCINE) LOCK FLUSH IV SOLN 100 UNIT/ML 100 UNIT/ML
500 SOLUTION INTRAVENOUS PRN
OUTPATIENT
Start: 2025-08-12

## 2025-08-12 RX ORDER — DIPHENHYDRAMINE HYDROCHLORIDE 50 MG/ML
50 INJECTION, SOLUTION INTRAMUSCULAR; INTRAVENOUS
OUTPATIENT
Start: 2025-08-12

## 2025-08-12 RX ORDER — ALBUTEROL SULFATE 90 UG/1
4 INHALANT RESPIRATORY (INHALATION) PRN
OUTPATIENT
Start: 2025-08-12

## 2025-08-12 RX ORDER — ONDANSETRON 2 MG/ML
8 INJECTION INTRAMUSCULAR; INTRAVENOUS
OUTPATIENT
Start: 2025-08-12

## (undated) DEVICE — Device

## (undated) DEVICE — TUBING IV STOPCOCK 48 CM 3 W

## (undated) DEVICE — BANDAGE ADH W1XL3IN NAT FAB WVN FLX DURABLE N ADH PD SEAL

## (undated) DEVICE — 6 ML SYRINGE LUER-LOCK TIP: Brand: MONOJECT

## (undated) DEVICE — SET ADMIN 25ML L117IN PMP MOD CK VLV RLER CLMP 2 SMRTSITE

## (undated) DEVICE — 3 ML SYRINGE LUER-LOCK TIP: Brand: MONOJECT

## (undated) DEVICE — SOLUTION IV 1000ML 0.45% SOD CHL PH 5 INJ USP VIAFLX PLAS

## (undated) DEVICE — TOWEL,OR,DSP,ST,BLUE,DLX,4/PK,20PK/CS: Brand: MEDLINE